# Patient Record
Sex: FEMALE | Race: WHITE
[De-identification: names, ages, dates, MRNs, and addresses within clinical notes are randomized per-mention and may not be internally consistent; named-entity substitution may affect disease eponyms.]

---

## 2017-09-24 ENCOUNTER — HOSPITAL ENCOUNTER (INPATIENT)
Dept: HOSPITAL 10 - MS2 | Age: 77
LOS: 2 days | Discharge: HOME | DRG: 314 | End: 2017-09-26
Attending: INTERNAL MEDICINE | Admitting: INTERNAL MEDICINE
Payer: COMMERCIAL

## 2017-09-24 VITALS — SYSTOLIC BLOOD PRESSURE: 178 MMHG | RESPIRATION RATE: 18 BRPM | HEART RATE: 69 BPM | DIASTOLIC BLOOD PRESSURE: 78 MMHG

## 2017-09-24 VITALS — HEIGHT: 59 IN | WEIGHT: 134.04 LBS | BODY MASS INDEX: 27.02 KG/M2

## 2017-09-24 VITALS — HEART RATE: 70 BPM | SYSTOLIC BLOOD PRESSURE: 133 MMHG | DIASTOLIC BLOOD PRESSURE: 63 MMHG

## 2017-09-24 DIAGNOSIS — I12.0: ICD-10-CM

## 2017-09-24 DIAGNOSIS — N18.6: ICD-10-CM

## 2017-09-24 DIAGNOSIS — Z99.2: ICD-10-CM

## 2017-09-24 DIAGNOSIS — E11.9: ICD-10-CM

## 2017-09-24 DIAGNOSIS — I48.91: ICD-10-CM

## 2017-09-24 DIAGNOSIS — Z79.02: ICD-10-CM

## 2017-09-24 DIAGNOSIS — T82.868A: Primary | ICD-10-CM

## 2017-09-24 PROCEDURE — 90935 HEMODIALYSIS ONE EVALUATION: CPT

## 2017-09-24 PROCEDURE — 85610 PROTHROMBIN TIME: CPT

## 2017-09-24 PROCEDURE — 85730 THROMBOPLASTIN TIME PARTIAL: CPT

## 2017-09-24 PROCEDURE — 85025 COMPLETE CBC W/AUTO DIFF WBC: CPT

## 2017-09-24 PROCEDURE — 71020: CPT

## 2017-09-24 PROCEDURE — 93931 UPPER EXTREMITY STUDY: CPT

## 2017-09-24 PROCEDURE — 83036 HEMOGLOBIN GLYCOSYLATED A1C: CPT

## 2017-09-24 PROCEDURE — 87081 CULTURE SCREEN ONLY: CPT

## 2017-09-24 PROCEDURE — 80053 COMPREHEN METABOLIC PANEL: CPT

## 2017-09-24 PROCEDURE — 82962 GLUCOSE BLOOD TEST: CPT

## 2017-09-24 PROCEDURE — 93005 ELECTROCARDIOGRAM TRACING: CPT

## 2017-09-24 PROCEDURE — 83735 ASSAY OF MAGNESIUM: CPT

## 2017-09-24 PROCEDURE — 93970 EXTREMITY STUDY: CPT

## 2017-09-24 PROCEDURE — 84100 ASSAY OF PHOSPHORUS: CPT

## 2017-09-24 RX ADMIN — HYDRALAZINE HYDROCHLORIDE PRN MG: 20 INJECTION INTRAMUSCULAR; INTRAVENOUS at 23:00

## 2017-09-25 VITALS — DIASTOLIC BLOOD PRESSURE: 80 MMHG | SYSTOLIC BLOOD PRESSURE: 152 MMHG | HEART RATE: 70 BPM

## 2017-09-25 VITALS — DIASTOLIC BLOOD PRESSURE: 88 MMHG | SYSTOLIC BLOOD PRESSURE: 186 MMHG | RESPIRATION RATE: 20 BRPM

## 2017-09-25 VITALS — SYSTOLIC BLOOD PRESSURE: 144 MMHG | DIASTOLIC BLOOD PRESSURE: 79 MMHG | HEART RATE: 67 BPM

## 2017-09-25 VITALS — RESPIRATION RATE: 20 BRPM | SYSTOLIC BLOOD PRESSURE: 112 MMHG | DIASTOLIC BLOOD PRESSURE: 54 MMHG

## 2017-09-25 VITALS — DIASTOLIC BLOOD PRESSURE: 79 MMHG | HEART RATE: 69 BPM | SYSTOLIC BLOOD PRESSURE: 148 MMHG

## 2017-09-25 VITALS — SYSTOLIC BLOOD PRESSURE: 165 MMHG | RESPIRATION RATE: 16 BRPM | DIASTOLIC BLOOD PRESSURE: 74 MMHG

## 2017-09-25 VITALS — HEART RATE: 67 BPM | SYSTOLIC BLOOD PRESSURE: 165 MMHG | DIASTOLIC BLOOD PRESSURE: 81 MMHG

## 2017-09-25 VITALS — DIASTOLIC BLOOD PRESSURE: 73 MMHG | SYSTOLIC BLOOD PRESSURE: 165 MMHG | HEART RATE: 74 BPM

## 2017-09-25 VITALS — HEART RATE: 67 BPM | DIASTOLIC BLOOD PRESSURE: 83 MMHG | SYSTOLIC BLOOD PRESSURE: 156 MMHG

## 2017-09-25 VITALS — SYSTOLIC BLOOD PRESSURE: 159 MMHG | DIASTOLIC BLOOD PRESSURE: 78 MMHG | HEART RATE: 70 BPM

## 2017-09-25 VITALS — RESPIRATION RATE: 16 BRPM | SYSTOLIC BLOOD PRESSURE: 161 MMHG | DIASTOLIC BLOOD PRESSURE: 73 MMHG

## 2017-09-25 VITALS — DIASTOLIC BLOOD PRESSURE: 73 MMHG | HEART RATE: 68 BPM | SYSTOLIC BLOOD PRESSURE: 148 MMHG

## 2017-09-25 VITALS — SYSTOLIC BLOOD PRESSURE: 166 MMHG | DIASTOLIC BLOOD PRESSURE: 81 MMHG | HEART RATE: 70 BPM

## 2017-09-25 LAB
ALBUMIN SERPL-MCNC: 3.6 G/DL (ref 3.3–4.9)
ALBUMIN/GLOB SERPL: 1.12 {RATIO}
ALP SERPL-CCNC: 90 IU/L (ref 42–121)
ALT SERPL-CCNC: 33 IU/L (ref 13–69)
ANION GAP SERPL CALC-SCNC: 16 MMOL/L (ref 8–16)
APTT BLD: 32 SEC (ref 25–35)
AST SERPL-CCNC: 14 IU/L (ref 15–46)
BASOPHILS # BLD AUTO: 0 10^3/UL (ref 0–0.1)
BASOPHILS NFR BLD: 0.5 % (ref 0–2)
BILIRUB DIRECT SERPL-MCNC: 0 MG/DL (ref 0–0.2)
BILIRUB SERPL-MCNC: 0 MG/DL (ref 0.2–1.3)
BUN SERPL-MCNC: 61 MG/DL (ref 7–20)
CALCIUM SERPL-MCNC: 9.1 MG/DL (ref 8.4–10.2)
CHLORIDE SERPL-SCNC: 102 MMOL/L (ref 97–110)
CO2 SERPL-SCNC: 25 MMOL/L (ref 21–31)
CREAT SERPL-MCNC: 5.67 MG/DL (ref 0.44–1)
EOSINOPHIL # BLD: 0.4 10^3/UL (ref 0–0.5)
EOSINOPHIL NFR BLD: 5.5 % (ref 0–7)
ERYTHROCYTE [DISTWIDTH] IN BLOOD BY AUTOMATED COUNT: 14.3 % (ref 11.5–14.5)
GLOBULIN SER-MCNC: 3.2 G/DL (ref 1.3–3.2)
GLUCOSE SERPL-MCNC: 172 MG/DL (ref 70–220)
HCT VFR BLD CALC: 28.1 % (ref 37–47)
HGB BLD-MCNC: 8.7 G/DL (ref 12–16)
INR PPP: 1.76
LYMPHOCYTES # BLD AUTO: 2.4 10^3/UL (ref 0.8–2.9)
LYMPHOCYTES NFR BLD AUTO: 32.2 % (ref 15–51)
MAGNESIUM SERPL-MCNC: 2 MG/DL (ref 1.7–2.5)
MCH RBC QN AUTO: 27.8 PG (ref 29–33)
MCHC RBC AUTO-ENTMCNC: 31 G/DL (ref 32–37)
MCV RBC AUTO: 89.8 FL (ref 82–101)
MONOCYTES # BLD: 0.5 10^3/UL (ref 0.3–0.9)
MONOCYTES NFR BLD: 6.3 % (ref 0–11)
NEUTROPHILS # BLD: 4 10^3/UL (ref 1.6–7.5)
NEUTROPHILS NFR BLD AUTO: 55 % (ref 39–77)
NRBC # BLD MANUAL: 0 10^3/UL (ref 0–0)
NRBC BLD AUTO-RTO: 0 /100WBC (ref 0–0)
PHOSPHATE SERPL-MCNC: 4 MG/DL (ref 2.5–4.9)
PLATELET # BLD: 252 10^3/UL (ref 140–415)
PMV BLD AUTO: 10.1 FL (ref 7.4–10.4)
POTASSIUM SERPL-SCNC: 4.6 MMOL/L (ref 3.5–5.1)
PROT SERPL-MCNC: 6.8 G/DL (ref 6.1–8.1)
PROTHROMBIN TIME: 20.7 SEC (ref 12.2–14.2)
PT RATIO: 1.6
RBC # BLD AUTO: 3.13 10^6/UL (ref 4.2–5.4)
SODIUM SERPL-SCNC: 138 MMOL/L (ref 135–144)
WBC # BLD AUTO: 7.3 10^3/UL (ref 4.8–10.8)

## 2017-09-25 PROCEDURE — 3E05317 INTRODUCTION OF OTHER THROMBOLYTIC INTO PERIPHERAL ARTERY, PERCUTANEOUS APPROACH: ICD-10-PCS | Performed by: SURGERY

## 2017-09-25 PROCEDURE — 5A1D00Z: ICD-10-PCS

## 2017-09-25 RX ADMIN — NIFEDIPINE SCH MG: 90 TABLET, FILM COATED, EXTENDED RELEASE ORAL at 08:35

## 2017-09-25 RX ADMIN — ISOSORBIDE MONONITRATE SCH MG: 30 TABLET, EXTENDED RELEASE ORAL at 08:34

## 2017-09-25 RX ADMIN — FUROSEMIDE SCH MG: 40 TABLET ORAL at 08:35

## 2017-09-25 RX ADMIN — NIFEDIPINE SCH MG: 90 TABLET, FILM COATED, EXTENDED RELEASE ORAL at 21:34

## 2017-09-25 RX ADMIN — LOSARTAN POTASSIUM SCH MG: 50 TABLET, FILM COATED ORAL at 08:34

## 2017-09-25 RX ADMIN — HYDRALAZINE HYDROCHLORIDE PRN MG: 20 INJECTION INTRAMUSCULAR; INTRAVENOUS at 20:45

## 2017-09-25 RX ADMIN — PAROXETINE SCH MG: 10 TABLET, FILM COATED ORAL at 08:35

## 2017-09-25 NOTE — CONS
Date/Time of Note


Date/Time of Note


DATE: 9/25/17 


TIME: 14:54





Assessment/Plan


Assessment/Plan


Additional Assessment/Plan


1. Malfunctioning Permcath HD catheter, missed HD 


2.ESRD on HD on MWF schedule


3. Hypertension 


4. Hyperlipidemia


5. Diabetes melitus


6. atrial fibrillation on coumadin 





Plan :


tPA in HD catheter, we will have HD Nurse to check on it


plan for HD today after tPA use


Dr. santana has been consulted to change permacath due to her recurrent Catheter 

problems


will continue HD on Monday, wednesday and Friday





Consultation Date/Type/Reason


Admit Date/Time


Sep 24, 2017 at 21:40


Date of Consultation:  Sep 24, 2017


Type of Consultation:  NEPHROLOGY


Reason for Consultation


ESRD on HD with malfunctioning  HD catheter


Referring Provider:  FELIZ TUTTLE of Present Illness


76-year-old female being transferred from [facility] who 

presented because her permacatheter for dialysis was not functioning properly 

despite her nephrologist using TPA.  She was sent to the hospital to have 

permacatheter placement and possible mapping for AV fistula formation.  She 

does not know the name of her nephrologist.  She has not been to this hospital 

before.  She was transferred to [facility] as her insurance was 

capitated here.  She has a Monday Wednesday Friday schedule. Renal has been 

consulted for her HD need and for her Clotted Dialysis catheter. tPA was placed 

in ED.. At the time of evaluation she is chest pain free, no SOB.


Constitutional:  no complaints


Eyes:  no complaints


ENT:  no complaints


Respiratory:  cough, pain, pleuritic pain, shortness of breath


Cardiovascular:  no complaints


Gastrointestinal:  no complaints


Genitourinary:  no complaints


Musculoskeletal:  no complaints


Skin:  no complaints


Neurologic:  no complaints


Endocrine:  no complaints


Lymphatic:  no complaints


Psychological:  no complaints


Immunologic:  no complaints





Past Medical History


Medical History:  diabetes, high cholesterol, hypertension, other (ESRD on HD, 

Atrial fibrillation )





Past Surgical History


Past Surgical Hx:  other (Permacath placement, AVF surgery )





Family History


Significant Family History:  no pertinent family hx





Social History


Alcohol Use:  none


Smoking Status:  Never smoker


Drug Use:  none





Exam/Review of Systems


Vital Signs


Vitals





 Vital Signs








  Date Time  Temp Pulse Resp B/P Pulse Ox O2 Delivery O2 Flow Rate FiO2


 


9/25/17 07:26 98.2 69 16 165/74 95   


 


9/24/17 22:00      Room Air  











Exam


Constitutional:  alert


Psych:  no complaints


Head:  normocephalic


Eyes:  nl conjunctiva


ENMT:  nl external ears & nose


Neck:  non-tender, supple


Respiratory:  clear to auscultation, diminished breath sounds, normal air 

movement


Cardiovascular:  irregular rhythm, nl pulses


Gastrointestinal:  soft


Musculoskeletal:  nl extremities to inspection, nl gait and stance


Neurological:  CNS II-XII intact, nl mental status, nl speech, nl strength


Skin:  nl turgor, rash or lesions





Results


Result Diagram:  


9/25/17 0528                                                                   

             9/25/17 0518





Results 24 hrs





Laboratory Tests








Test


  9/25/17


00:36 9/25/17


05:18 9/25/17


05:28 9/25/17


07:52


 


Bedside Glucose 168     152  


 


Prothrombin Time  20.7  H  


 


Prothrombin Time Ratio  1.6    


 


INR International Normalized


Ratio 


  1.76  


  


  


 


 


Activated Partial


Thromboplast Time 


  32.0  


  


  


 


 


Sodium Level  138    


 


Potassium Level  4.6    


 


Chloride Level  102    


 


Carbon Dioxide Level  25    


 


Anion Gap  16    


 


Blood Urea Nitrogen  61  H  


 


Creatinine  5.67  H  


 


Glucose Level  172    


 


Hemoglobin A1c  7.3  H  


 


Calcium Level  9.1    


 


Phosphorus Level  4.0    


 


Magnesium Level  2.0    


 


Total Bilirubin  0.0  L  


 


Direct Bilirubin  0.00    


 


Indirect Bilirubin  0.0    


 


Aspartate Amino Transf


(AST/SGOT) 


  14  L


  


  


 


 


Alanine Aminotransferase


(ALT/SGPT) 


  33  


  


  


 


 


Alkaline Phosphatase  90    


 


Total Protein  6.8    


 


Albumin  3.6    


 


Globulin  3.20    


 


Albumin/Globulin Ratio  1.12    


 


White Blood Count   7.3   


 


Red Blood Count   3.13  L 


 


Hemoglobin   8.7  L 


 


Hematocrit   28.1  L 


 


Mean Corpuscular Volume   89.8   


 


Mean Corpuscular Hemoglobin   27.8  L 


 


Mean Corpuscular Hemoglobin


Concent 


  


  31.0  L


  


 


 


Red Cell Distribution Width   14.3   


 


Platelet Count   252   


 


Mean Platelet Volume   10.1   


 


Neutrophils %   55.0   


 


Lymphocytes %   32.2   


 


Monocytes %   6.3   


 


Eosinophils %   5.5   


 


Basophils %   0.5   


 


Nucleated Red Blood Cells %   0.0   


 


Neutrophils #   4.0   


 


Lymphocytes #   2.4   


 


Monocytes #   0.5   


 


Eosinophils #   0.4   


 


Basophils #   0.0   


 


Nucleated Red Blood Cells #   0.0   














Test


  9/25/17


12:01 


  


  


 


 


Bedside Glucose 144     











Medications


Medications





 Current Medications


Hydralazine HCl (Apresoline) 10 mg Q4H  PRN IV SBP ABOVE 160;  Start 9/24/17 at 

22:30


Hydralazine HCl (Apresoline) 20 mg Q4H  PRN IV SBP ABOVE 180 Last administered 

on 9/24/17at 23:00; Admin Dose 20 MG;  Start 9/24/17 at 22:30


Ondansetron HCl (Zofran Inj) 4 mg Q6H  PRN IV NAUSEA AND/OR VOMITING;  Start 9/ 25/17 at 01:00


Acetaminophen (Tylenol Tab) 650 mg Q6H  PRN PO PAIN LEVEL 1-3 OR FEVER;  Start 9 /25/17 at 01:00


Morphine Sulfate (morphine) 2 mg Q4H  PRN IV SEVERE PAIN LEVEL 7-10;  Start 9/25 /17 at 01:00


Docusate Sodium (Colace) 100 mg Q12H  PRN PO CONSTIPATION;  Start 9/25/17 at 01:

00


Bisacodyl (Dulcolax) 5 mg DAILY  PRN PO CONSTIPATION;  Start 9/25/17 at 01:00


Atorvastatin Calcium (Lipitor) 80 mg QHS PO ;  Start 9/25/17 at 21:00


Carvedilol (Coreg) 6.25 mg BID PO  Last administered on 9/25/17at 08:35; Admin 

Dose 6.25 MG;  Start 9/25/17 at 09:00


Furosemide (Lasix) 40 mg DAILY PO  Last administered on 9/25/17at 08:35; Admin 

Dose 40 MG;  Start 9/25/17 at 09:00


Isosorbide Mononitrate (Imdur) 30 mg DAILY PO  Last administered on 9/25/17at 08

:34; Admin Dose 30 MG;  Start 9/25/17 at 09:00


Losartan Potassium (Cozaar) 100 mg DAILY PO  Last administered on 9/25/17at 08:

34; Admin Dose 100 MG;  Start 9/25/17 at 09:00


Nifedipine (Procardia Xl) 90 mg BID PO  Last administered on 9/25/17at 08:35; 

Admin Dose 90 MG;  Start 9/25/17 at 09:00


Paroxetine HCl (Paxil) 10 mg DAILY PO  Last administered on 9/25/17at 08:35; 

Admin Dose 10 MG;  Start 9/25/17 at 09:00


Diagnostic Test (Pha) (Accu-Chek) 1 ea 02 XX ;  Start 9/26/17 at 02:00


Diagnostic Test (Pha) (Accu-Chek) 1 ea 02 XX ;  Start 9/26/17 at 02:00


Miscellaneous Information 1 ea NOTE XX ;  Start 9/25/17 at 05:30


Glucose (Glutose) 15 gm Q15M  PRN PO DECREASED GLUCOSE;  Start 9/25/17 at 05:30


Glucose (Glutose) 22.5 gm Q15M  PRN PO DECREASED GLUCOSE;  Start 9/25/17 at 05:

30


Dextrose (D50w Syringe) 25 ml Q15M  PRN IV DECREASED GLUCOSE;  Start 9/25/17 at 

05:30


Dextrose (D50w Syringe) 50 ml Q15M  PRN IV DECREASED GLUCOSE;  Start 9/25/17 at 

05:30


Glucagon (Glucagen) 1 mg Q15M  PRN IM DECREASED GLUCOSE;  Start 9/25/17 at 05:30


Glucose (Glutose) 15 gm Q15M  PRN BUCCAL DECREASED GLUCOSE;  Start 9/25/17 at 05

:30


Famotidine (Pepcid) 20 mg DAILY PO ;  Start 9/26/17 at 09:00











RUBY ZUNIGA MD Sep 25, 2017 14:55

## 2017-09-25 NOTE — RADRPT
PROCEDURE:   Chest 2 views. 

 

CLINICAL INDICATION:   Abnormal breath sounds, preop. 

 

TECHNIQUE:   PA and lateral views of the chest were obtained. 

 

COMPARISON:   None. 

 

FINDINGS:

The heart size is within normal limits. Calcified atherosclerosis is noted in the aorta. Right-sided
 dialysis catheter has its tip in the expected location of the mid superior vena cava. No consolidat
ions are identified.  No pneumothorax is seen. Elevated right hemidiaphragm is noted.  Osseous struc
tures are intact. 

 

IMPRESSION:

Calcified atherosclerosis in the aorta. 

 

Right-sided dialysis catheter with its tip in the expected location of the mid superior vena cava.

 

Elevated right hemidiaphragm.

 

Clear lungs.

 

 

RPTAT: AA

_____________________________________________ 

.Baltazar Jones MD, MD           Date    Time 

Electronically viewed and signed by .Baltazar Jones MD, MD on 09/25/2017 09:45 

 

D:  09/25/2017 09:45  T:  09/25/2017 09:45

.P/

## 2017-09-25 NOTE — HP
Date/Time of Note


Date/Time of Note


DATE: 9/25/17 


TIME: 05:56





Assessment/Plan


VTE Prophylaxis


VTE Prophylaxis Intervention:  SCD's





Lines/Catheters


IV Catheter Type (from [facility]):  Saline Lock





Assessment/Plan


Chief Complaint/Hosp Course


This is a 76 year female being admitted to the [facility] for:





#1 malfunctioning dialysis catheter: Apparently as per the patient's 

nephrologist her permacath was not working despite the use of TPA.  She was 

sent in for evaluation for dialysis access.  At the current time we will 

consult nephrology and vascular surgery for dialysis access.  Consider new 

permacath placement and possibility for venous mapping for eventual AV fistula.

  Will obtain a chest x-ray and EKG in the a.m. for preop purposes.





#2  End-stage renal disease: Recent laboratories show sodium of 133 and and a 

BUN of 55 and a creatinine of 4.9.  Would not have any previous labs to compare 

this to.  At the current time patient does state that she is nonoliguric.  Will 

consult nephrology.  Continue patient's home medications.





#3 atrial fibrillation: We will obtain a 12-lead EKG.  Will check PT/INR daily.

  She will be due for Coumadin on the afternoon of 9/25/17 however we will 

confirm with vascular surgery and nephrology regarding possible surgical 

intervention and the timeframe needed for this medication to be held.





#4 hypertension: Continue patient's home medications monitor blood pressure





#5 diabetes mellitus: We will check hemoglobin A1c, insulin sliding scale.





#6 DVT GI prophylaxis: SCDs, acid blocker.  Patient has currently on Coumadin 

as well and will confirm whether she needs her dose on 9/25 once we confirm 

with vascular and nephrology regarding dialysis catheter placement.





Further treatment strategy will be implemented as per the clinical course


Problems:  





HPI/ROS


Admit Date/Time


Admit Date/Time


Sep 24, 2017 at 21:40





Hx of Present Illness


Chief complaint: Malfunctioning dialysis permacatheter





This is a 76-year-old female being transferred from [facility] 

who presented because her permacatheter for dialysis was not functioning 

properly despite her nephrologist using TPA.  She was sent to the hospital to 

have permacatheter placement and possible mapping for AV fistula formation.  

She does not know the name of her nephrologist.  She has not been to this 

hospital before.  She was transferred to [facility] as her insurance 

was capitated here.  She has a Monday Wednesday Friday schedule.  Patient is 

unable to provide me with the location of the dialysis center that she goes to.

  She is compliant with her Coumadin.





Allergies: NKDA





Medications: See MAR





ROS


Const: Negative for fever, chills, weight gain or weight loss, fatigue, or 

diaphoresis


Eyes : No pain discharge or redness or change in visual acuity


ENT: No pain, sore throat, congestion, congestion,  dysphagia or discharge


Respiratory: No shortness of breath, cough, sputum, wheezing, or pleuritic pain


Cardiovascular: No chest pain, palpitation, PND, or edema


GI : no change in appetite, abdominal pain, nausea, vomiting, diarrhea, 

constipation, or change in the color his stool 


Genitourinary: No dysuria, hematuria, flank pain ,  discharge or CVA tenderness


Musculoskeletal: No joint pain, back pain, neck pain, restricted range of 

motion in neck or joints


Skin: No rash, bruising or hives 


Neuro: No headache, dizziness, syncope, seizure, focal weakness


Endocrine: No polyuria, polydipsia, temperature intolerance


Psych: No hallucination, depression, anxiety or suicidal ideation





PMH/Family/Social


Past Medical History


Atrial fibrillation on Coumadin, hypertension, diabetes mellitus, end-stage 

renal disease





Past Surgical History


Left AV fistula surgery however it is no longer patent,  Hysterectomy, 

permacath on the right chest wall malfunctioning now





Family History


Significant Family History:  no pertinent family hx





Social History


Alcohol Use:  none


Smoking Status:  Never smoker


Drug Use:  none





Exam/Review of Systems


Vital Signs


Vitals





 Vital Signs








  Date Time  Temp Pulse Resp B/P Pulse Ox O2 Delivery O2 Flow Rate FiO2


 


9/25/17 02:04 98.4 71 20 112/54 93   


 


9/24/17 22:00      Room Air  











Exam


Exam





General: Patient is a well-developed female lying in bed in no acute distress


HEENT: Atraumatic, normocephalic. The pupils are equal, round and reactive. 

Extraocular motor are intact


Neck: Supple with full range of motion. No rigidity or meningismus


Chest: Right chest permacatheter placement


Lungs: Clear to auscultation bilaterally no crackles rales or wheezing


Heart: Normal S1-S2, Regular rhythm and rate. No murmur, S3, or S4


Abdomen: Soft , nontender,  nondistended , bowel sounds are present. No 

guarding no rebound tenderness , No masses or organomegaly. No costovertebral 

temporal angle mass


Extremities: Normal to inspection, no edema no cyanosis, left upper extremity 

nonfunctioning AV fistula


Neurologic: Normal mental status, speech normal, cranial nerves II through XII 

are intact, motor and sensory are intact, no focal weakness


Additional Comments


Pertinent laboratory findings from transferring facility:





Hemoglobin 9.6 hematocrit 20.4 white blood cell count 7.4 platelets 228


BMP: Sodium 133 potassium 4.5 chloride 97 bicarb 23 BUN 53 creatinine 4.9 GFR 8


[place] 3.9 magnesium 2.2


Please see transfer documentation in the chart for complete details.





Labs


Result Diagram:  


9/25/17 0528








Medications


Medications





 Current Medications


Hydralazine HCl (Apresoline) 10 mg Q4H  PRN IV SBP ABOVE 160;  Start 9/24/17 at 

22:30


Hydralazine HCl (Apresoline) 20 mg Q4H  PRN IV SBP ABOVE 180 Last administered 

on 9/24/17at 23:00; Admin Dose 20 MG;  Start 9/24/17 at 22:30


Ondansetron HCl (Zofran Inj) 4 mg Q6H  PRN IV NAUSEA AND/OR VOMITING;  Start 9/ 25/17 at 01:00


Acetaminophen (Tylenol Tab) 650 mg Q6H  PRN PO PAIN LEVEL 1-3 OR FEVER;  Start 9 /25/17 at 01:00


Morphine Sulfate (morphine) 2 mg Q4H  PRN IV SEVERE PAIN LEVEL 7-10;  Start 9/25 /17 at 01:00


Docusate Sodium (Colace) 100 mg Q12H  PRN PO CONSTIPATION;  Start 9/25/17 at 01:

00


Bisacodyl (Dulcolax) 5 mg DAILY  PRN PO CONSTIPATION;  Start 9/25/17 at 01:00


Famotidine (Pepcid) 20 mg Q12 PO ;  Start 9/25/17 at 09:00


Atorvastatin Calcium (Lipitor) 80 mg QHS PO ;  Start 9/25/17 at 21:00


Carvedilol (Coreg) 6.25 mg BID PO ;  Start 9/25/17 at 09:00


Furosemide (Lasix) 40 mg DAILY PO ;  Start 9/25/17 at 09:00


Isosorbide Mononitrate (Imdur) 30 mg DAILY PO ;  Start 9/25/17 at 09:00


Losartan Potassium (Cozaar) 100 mg DAILY PO ;  Start 9/25/17 at 09:00


Nifedipine (Procardia Xl) 90 mg BID PO ;  Start 9/25/17 at 09:00


Paroxetine HCl (Paxil) 10 mg DAILY PO ;  Start 9/25/17 at 09:00


Diagnostic Test (Pha) (Accu-Chek) 1 ea 02 XX ;  Start 9/26/17 at 02:00


Diagnostic Test (Pha) (Accu-Chek) 1 ea 02 XX ;  Start 9/26/17 at 02:00


Miscellaneous Information 1 ea NOTE XX ;  Start 9/25/17 at 05:30


Glucose (Glutose) 15 gm Q15M  PRN PO DECREASED GLUCOSE;  Start 9/25/17 at 05:30


Glucose (Glutose) 22.5 gm Q15M  PRN PO DECREASED GLUCOSE;  Start 9/25/17 at 05:

30


Dextrose (D50w Syringe) 25 ml Q15M  PRN IV DECREASED GLUCOSE;  Start 9/25/17 at 

05:30


Dextrose (D50w Syringe) 50 ml Q15M  PRN IV DECREASED GLUCOSE;  Start 9/25/17 at 

05:30


Glucagon (Glucagen) 1 mg Q15M  PRN IM DECREASED GLUCOSE;  Start 9/25/17 at 05:30


Glucose (Glutose) 15 gm Q15M  PRN BUCCAL DECREASED GLUCOSE;  Start 9/25/17 at 05

:30











FELIZ TUTTLE Sep 25, 2017 06:07

## 2017-09-26 VITALS — DIASTOLIC BLOOD PRESSURE: 62 MMHG | RESPIRATION RATE: 16 BRPM | SYSTOLIC BLOOD PRESSURE: 146 MMHG

## 2017-09-26 VITALS — RESPIRATION RATE: 18 BRPM | SYSTOLIC BLOOD PRESSURE: 134 MMHG | DIASTOLIC BLOOD PRESSURE: 63 MMHG

## 2017-09-26 VITALS — RESPIRATION RATE: 16 BRPM | DIASTOLIC BLOOD PRESSURE: 59 MMHG | SYSTOLIC BLOOD PRESSURE: 126 MMHG

## 2017-09-26 LAB
ALBUMIN SERPL-MCNC: 3.8 G/DL (ref 3.3–4.9)
ALBUMIN/GLOB SERPL: 1.26 {RATIO}
ALP SERPL-CCNC: 85 IU/L (ref 42–121)
ALT SERPL-CCNC: 32 IU/L (ref 13–69)
ANION GAP SERPL CALC-SCNC: 15 MMOL/L (ref 8–16)
APTT BLD: 30.3 SEC (ref 25–35)
AST SERPL-CCNC: 18 IU/L (ref 15–46)
BASOPHILS # BLD AUTO: 0 10^3/UL (ref 0–0.1)
BASOPHILS NFR BLD: 0.6 % (ref 0–2)
BILIRUB DIRECT SERPL-MCNC: 0 MG/DL (ref 0–0.2)
BILIRUB SERPL-MCNC: 0 MG/DL (ref 0.2–1.3)
BUN SERPL-MCNC: 48 MG/DL (ref 7–20)
CALCIUM SERPL-MCNC: 8.6 MG/DL (ref 8.4–10.2)
CHLORIDE SERPL-SCNC: 103 MMOL/L (ref 97–110)
CO2 SERPL-SCNC: 27 MMOL/L (ref 21–31)
CREAT SERPL-MCNC: 4.62 MG/DL (ref 0.44–1)
EOSINOPHIL # BLD: 0.3 10^3/UL (ref 0–0.5)
EOSINOPHIL NFR BLD: 4.5 % (ref 0–7)
ERYTHROCYTE [DISTWIDTH] IN BLOOD BY AUTOMATED COUNT: 14.1 % (ref 11.5–14.5)
GLOBULIN SER-MCNC: 3 G/DL (ref 1.3–3.2)
GLUCOSE SERPL-MCNC: 157 MG/DL (ref 70–220)
HCT VFR BLD CALC: 29.1 % (ref 37–47)
HGB BLD-MCNC: 9.2 G/DL (ref 12–16)
INR PPP: 1.59
LYMPHOCYTES # BLD AUTO: 2.1 10^3/UL (ref 0.8–2.9)
LYMPHOCYTES NFR BLD AUTO: 29.9 % (ref 15–51)
MAGNESIUM SERPL-MCNC: 1.9 MG/DL (ref 1.7–2.5)
MCH RBC QN AUTO: 28.2 PG (ref 29–33)
MCHC RBC AUTO-ENTMCNC: 31.6 G/DL (ref 32–37)
MCV RBC AUTO: 89.3 FL (ref 82–101)
MONOCYTES # BLD: 0.6 10^3/UL (ref 0.3–0.9)
MONOCYTES NFR BLD: 8.9 % (ref 0–11)
NEUTROPHILS # BLD: 3.8 10^3/UL (ref 1.6–7.5)
NEUTROPHILS NFR BLD AUTO: 55.5 % (ref 39–77)
NRBC # BLD MANUAL: 0 10^3/UL (ref 0–0)
NRBC BLD AUTO-RTO: 0 /100WBC (ref 0–0)
PHOSPHATE SERPL-MCNC: 4.7 MG/DL (ref 2.5–4.9)
PLATELET # BLD: 253 10^3/UL (ref 140–415)
PMV BLD AUTO: 9.8 FL (ref 7.4–10.4)
POTASSIUM SERPL-SCNC: 4.7 MMOL/L (ref 3.5–5.1)
PROT SERPL-MCNC: 6.8 G/DL (ref 6.1–8.1)
PROTHROMBIN TIME: 19.1 SEC (ref 12.2–14.2)
PT RATIO: 1.5
RBC # BLD AUTO: 3.26 10^6/UL (ref 4.2–5.4)
SODIUM SERPL-SCNC: 140 MMOL/L (ref 135–144)
WBC # BLD AUTO: 6.9 10^3/UL (ref 4.8–10.8)

## 2017-09-26 RX ADMIN — NIFEDIPINE SCH MG: 90 TABLET, FILM COATED, EXTENDED RELEASE ORAL at 08:20

## 2017-09-26 RX ADMIN — LOSARTAN POTASSIUM SCH MG: 50 TABLET, FILM COATED ORAL at 08:21

## 2017-09-26 RX ADMIN — ISOSORBIDE MONONITRATE SCH MG: 30 TABLET, EXTENDED RELEASE ORAL at 08:21

## 2017-09-26 RX ADMIN — FUROSEMIDE SCH MG: 40 TABLET ORAL at 08:20

## 2017-09-26 RX ADMIN — PAROXETINE SCH MG: 10 TABLET, FILM COATED ORAL at 08:20

## 2017-09-26 NOTE — RADRPT
PROCEDURE:   US Bilateral Upper Extremity Veins. 

 

CLINICAL INDICATION:   Bilateral upper extremity swelling. Venous diameter for dialysis fistula plan
mahogany. End-stage renal disease.

 

TECHNIQUE:   Multiple longitudinal and transverse images of the bilateral upper extremity venous loulou
e was obtained with gray scale and color Doppler imaging. 

 

COMPARISON:   None available 

 

FINDINGS:

The subclavian veins, axillary, brachial, basilic, and cephalic veins are patent bilaterally. There 
is normal flow with augmentation and compressibility throughout.  There is no thrombus or occlusion.
 There is a left radial artery to cephalic vein dialysis fistula. Therefore, the left cephalic vein 
in the forearm was not evaluated.

Diameters are as follows:

Right arm cephalic upper: 0.26 cm. 

Right arm cephalic mid: 0.12 cm.

Right arm cephalic lower: 0.18 cm.

 

Right forearm cephalic upper: 0.18 cm. 

Right forearm cephalic mid: 0.15 cm.

Right forearm cephalic lower: 0.14 cm.

 

Right arm basilic upper: 0.69 cm. 

Right arm basilic mid: 0.23 cm.

Right arm basilic lower: 0.23 cm.

 

Right forearm basilic upper: 0.11 cm. 

Right forearm basilic mid: 0.13 cm.

Right forearm basilic lower: 0.11 cm.

 

Left arm cephalic upper: 0.23 cm. 

Left arm cephalic mid: 0.21 cm.

Left arm cephalic lower: 0.15 cm.

 

Left forearm cephalic: Not evaluated as indicated above. 

 

Left arm basilic upper: 0.92 cm. 

Left arm basilic mid: 0.18 cm.

Left arm basilic lower: 0.24 cm.

 

Left forearm basilic upper: 0.23 cm. 

Left forearm basilic mid: 0.17 cm.

Left forearm basilic lower:  0.13 cm.

 

IMPRESSION:

1.  Cephalic vein in the outflow vein 48 dialysis fistula.

2.  Otherwise normal venous system of the upper extremities.  No evidence of thrombus or occlusion.

3.  The diameter of the vessels as indicated above.

 

RPTAT: QQ

_____________________________________________ 

.Joe Leija MD, MD           Date    Time 

Electronically viewed and signed by .Joe Leija MD, MD on 09/26/2017 08:48 

 

D:  09/26/2017 08:48  T:  09/26/2017 08:48

.R/

## 2017-09-26 NOTE — CONS
Date/Time of Note


Date/Time of Note


DATE: 9/26/17 


TIME: 12:36





Assessment/Plan


Assessment/Plan


Chief Complaint/Hosp Course


76-year-old female being transferred from [facility] who 

presented because her permacatheter for dialysis was not functioning properly 

despite her nephrologist using TPA.  She was sent to the hospital to have 

permacatheter placement and possible mapping for AV fistula formation.  She 

does not know the name of her nephrologist.  She has not been to this hospital 

before.  She was transferred to [facility] as her insurance was 

capitated here.  She has a Monday Wednesday Friday schedule. Renal has been 

consulted for her HD need and for her Clotted Dialysis catheter. tPA was placed 

in ED.. At the time of evaluation she is chest pain free, no SOB.


Problems:  


Additional Assessment/Plan


1. Malfunctioning Permcath HD catheter, missed HD 


2.ESRD on HD on MWF schedule


3. Hypertension 


4. Hyperlipidemia


5. Diabetes melitus


6. atrial fibrillation on coumadin 





Plan :


tPA in HD catheter, worked good, ok to d/c home today 


will need vascular surgery follow up for AVF evaluatin, vein mapping done in 

hospital 


will continue HD on Monday, wednesday and Friday





Consultation Date/Type/Reason


Admit Date/Time


Sep 24, 2017 at 21:40


Initial Consult Date


9/24/17


Type of Consultation:  NEPHROLOGY


Referring Provider:  FELIZ TUTTLE





Exam/Review of Systems


Vital Signs


Vitals





 Vital Signs








  Date Time  Temp Pulse Resp B/P Pulse Ox O2 Delivery O2 Flow Rate FiO2


 


9/26/17 07:47 98.6 71 16 146/62 97   


 


9/24/17 22:00      Room Air  














 Intake and Output   


 


 9/25/17 9/25/17 9/26/17





 15:00 23:00 07:00


 


Intake Total  1140 ml 300 ml


 


Output Total  1000 ml 


 


Balance  140 ml 300 ml











Results


Result Diagram:  


9/26/17 0503                                                                   

             9/26/17 0502





Results 24 hrs





Laboratory Tests








Test


  9/25/17


17:15 9/25/17


20:32 9/26/17


01:24 9/26/17


05:02


 


Bedside Glucose 158   205   134   


 


Prothrombin Time    19.1  H


 


Prothrombin Time Ratio    1.5  


 


INR International Normalized


Ratio 


  


  


  1.59  


 


 


Activated Partial


Thromboplast Time 


  


  


  30.3  


 


 


Sodium Level    140  


 


Potassium Level    4.7  


 


Chloride Level    103  


 


Carbon Dioxide Level    27  


 


Anion Gap    15  


 


Blood Urea Nitrogen    48  #H


 


Creatinine    4.62  #H


 


Glucose Level    157  


 


Calcium Level    8.6  


 


Phosphorus Level    4.7  


 


Magnesium Level    1.9  


 


Total Bilirubin    0.0  L


 


Direct Bilirubin    0.00  


 


Indirect Bilirubin    0.0  


 


Aspartate Amino Transf


(AST/SGOT) 


  


  


  18  


 


 


Alanine Aminotransferase


(ALT/SGPT) 


  


  


  32  


 


 


Alkaline Phosphatase    85  


 


Total Protein    6.8  


 


Albumin    3.8  


 


Globulin    3.00  


 


Albumin/Globulin Ratio    1.26  














Test


  9/26/17


05:03 9/26/17


07:53 9/26/17


11:59 


 


 


White Blood Count 6.9     


 


Red Blood Count 3.26  L   


 


Hemoglobin 9.2  L   


 


Hematocrit 29.1  L   


 


Mean Corpuscular Volume 89.3     


 


Mean Corpuscular Hemoglobin 28.2  L   


 


Mean Corpuscular Hemoglobin


Concent 31.6  L


  


  


  


 


 


Red Cell Distribution Width 14.1     


 


Platelet Count 253     


 


Mean Platelet Volume 9.8     


 


Neutrophils % 55.5     


 


Lymphocytes % 29.9     


 


Monocytes % 8.9     


 


Eosinophils % 4.5     


 


Basophils % 0.6     


 


Nucleated Red Blood Cells % 0.0     


 


Neutrophils # 3.8     


 


Lymphocytes # 2.1     


 


Monocytes # 0.6     


 


Eosinophils # 0.3     


 


Basophils # 0.0     


 


Nucleated Red Blood Cells # 0.0     


 


Bedside Glucose  153   164   











Medications


Medications





 Current Medications


Hydralazine HCl (Apresoline) 10 mg Q4H  PRN IV SBP ABOVE 160;  Start 9/24/17 at 

22:30


Hydralazine HCl (Apresoline) 20 mg Q4H  PRN IV SBP ABOVE 180 Last administered 

on 9/25/17at 20:45; Admin Dose 20 MG;  Start 9/24/17 at 22:30


Ondansetron HCl (Zofran Inj) 4 mg Q6H  PRN IV NAUSEA AND/OR VOMITING;  Start 9/ 25/17 at 01:00


Acetaminophen (Tylenol Tab) 650 mg Q6H  PRN PO PAIN LEVEL 1-3 OR FEVER;  Start 9 /25/17 at 01:00


Morphine Sulfate (morphine) 2 mg Q4H  PRN IV SEVERE PAIN LEVEL 7-10;  Start 9/25 /17 at 01:00


Docusate Sodium (Colace) 100 mg Q12H  PRN PO CONSTIPATION;  Start 9/25/17 at 01:

00


Bisacodyl (Dulcolax) 5 mg DAILY  PRN PO CONSTIPATION;  Start 9/25/17 at 01:00


Atorvastatin Calcium (Lipitor) 80 mg QHS PO  Last administered on 9/25/17at 20:

38; Admin Dose 80 MG;  Start 9/25/17 at 21:00


Carvedilol (Coreg) 6.25 mg BID PO  Last administered on 9/26/17at 08:22; Admin 

Dose 6.25 MG;  Start 9/25/17 at 09:00


Furosemide (Lasix) 40 mg DAILY PO  Last administered on 9/26/17at 08:20; Admin 

Dose 40 MG;  Start 9/25/17 at 09:00


Isosorbide Mononitrate (Imdur) 30 mg DAILY PO  Last administered on 9/26/17at 08

:21; Admin Dose 30 MG;  Start 9/25/17 at 09:00


Losartan Potassium (Cozaar) 100 mg DAILY PO  Last administered on 9/26/17at 08:

21; Admin Dose 100 MG;  Start 9/25/17 at 09:00


Nifedipine (Procardia Xl) 90 mg BID PO  Last administered on 9/26/17at 08:20; 

Admin Dose 90 MG;  Start 9/25/17 at 09:00


Paroxetine HCl (Paxil) 10 mg DAILY PO  Last administered on 9/26/17at 08:20; 

Admin Dose 10 MG;  Start 9/25/17 at 09:00


Diagnostic Test (Pha) (Accu-Chek) 1 ea 02 XX  Last administered on 9/26/17at 01:

25; Admin Dose 1 EA;  Start 9/26/17 at 02:00


Miscellaneous Information 1 ea NOTE XX ;  Start 9/25/17 at 05:30


Glucose (Glutose) 15 gm Q15M  PRN PO DECREASED GLUCOSE;  Start 9/25/17 at 05:30


Glucose (Glutose) 22.5 gm Q15M  PRN PO DECREASED GLUCOSE;  Start 9/25/17 at 05:

30


Dextrose (D50w Syringe) 25 ml Q15M  PRN IV DECREASED GLUCOSE;  Start 9/25/17 at 

05:30


Dextrose (D50w Syringe) 50 ml Q15M  PRN IV DECREASED GLUCOSE;  Start 9/25/17 at 

05:30


Glucagon (Glucagen) 1 mg Q15M  PRN IM DECREASED GLUCOSE;  Start 9/25/17 at 05:30


Glucose (Glutose) 15 gm Q15M  PRN BUCCAL DECREASED GLUCOSE;  Start 9/25/17 at 05

:30


Famotidine (Pepcid) 20 mg DAILY PO  Last administered on 9/26/17at 08:21; Admin 

Dose 20 MG;  Start 9/26/17 at 09:00











RUBY ZUNIGA MD Sep 26, 2017 12:37

## 2017-09-26 NOTE — RADRPT
PROCEDURE:   US left upper extremity arterial system. 

 

CLINICAL INDICATION:   End-stage renal disease. Left upper extremity dialysis fistula malfunction. 

 

TECHNIQUE:   Multiple longitudinal and transverse images of the left upper extremity arterial tree w
as obtained with gray scale pulsed Doppler, and color Doppler imaging. 

 

COMPARISON:   None available 

 

FINDINGS:

The left radial artery demonstrates normal triphasic flow with a peak systolic velocity of 162 cm/se
c. There is a patent dialysis fistula at this site. Velocity and flow within the fistula is as follo
ws:

Proximal:  211 cm/sec. 183 mL per minute.

Mid:  28 cm/sec. 31 ml/minute.

Distal 149 cm/sec. 478 ml/minute.

Upper arm proximal: 173 cm/sec. 164 ml/minute.

Upper arm mid:  174 cm/sec. 87 ml/minute.

Upper arm distal: 70 cm/sec. 249 ml/minute.

 

IMPRESSION:

1.  Patent left radial artery to cephalic vein dialysis fistula.

2.  Velocities and volume of flow as indicated above.

 

RPTAT: QQ

_____________________________________________ 

.Joe Leija MD, MD           Date    Time 

Electronically viewed and signed by .Joe Leija MD, MD on 09/26/2017 08:46 

 

D:  09/26/2017 08:46  T:  09/26/2017 08:46

.R/

## 2017-09-26 NOTE — PDOCDIS
Discharge Instructions


CONDITION


Patient Condition:  Good





HOME CARE INSTRUCTIONS:


Special Diet:  RENAL





ACTIVITY:








Activity Restrictions:   No Restrictions











FOLLOW UP/APPOINTMENTS


Follow-up Plan


F/U WITH YOUR PCP IN 1-2 WEEKS











EDSON NAILS Sep 26, 2017 12:23

## 2017-09-26 NOTE — CONS
DATE OF ADMISSION:  09/24/2017

 

DATE OF CONSULTATION:  09/25/2017

 

REASON FOR CONSULTATION:  Dysfunctional Permcath.

 

HISTORY OF PRESENT ILLNESS:  This is a 76-year-old female, with a

history of end-stage renal disease, currently on dialysis with

Permcath.  Permcath was not functioning.  The patient has been

admitted.  TPA has been placed in the Permcath, which is now

functioning again.

 

PAST MEDICAL HISTORY:  Hypertension, hyperlipidemia, end-stage

renal disease, significant for atrial fibrillation.

 

PAST SURGICAL HISTORY:  Also positive left upper extremity AV

fistula, hysterectomy, PermCath.

 

ALLERGIES:  NONE.

 

SOCIAL HISTORY:  No smoking, drinking or drug use.

 

MEDICATION:  List reviewed.

 

PHYSICAL EXAMINATION:

VITAL SIGNS:  Blood pressure is 136/60, pulse is 80, respirations

18.

HEART:  A regular rate and rhythm.  Normal S1, S2.

LUNGS:  Clear.

ABDOMEN:  Soft.

EXTREMITIES:  Warm.  Permcath is in place.

 

LABORATORY:  Hemoglobin 8.7, white count 7.3.  Potassium 4.5.

 

IMPRESSION:  Dysfunctional left upper extremity arteriovenous

fistula, which is now functioning okay after tPA infusion.

 

RECOMMENDATIONS:  We will continue dialysis.  Patient will be a

candidate to have an AV fistula when she is more stable.

 

 

 

Dictated By:  Murray Ferguson MD

 

/feng/paulette

Job#:  [id number]/Document#:  [id number]

D:  09/25/2017 18:19

T:  09/26/2017 03:36

## 2017-09-26 NOTE — DS
Date/Time of Note


Date/Time of Note


DATE: 9/26/17 


TIME: 15:20





Discharge Summary


Admission/Discharge Info


Admit Date/Time


Sep 24, 2017 at 21:40


Discharge Date/Time


September 26, 2017


Discharge Diagnosis





#1  End-stage renal disease with malfunctioning dialysis catheter-

malfunctioning status post TPA


Patient to have outpatient fistulogram to evaluate if fistula is functioning, 

the meantime patient will receive dialysis via [facility]


Vascular surgery and nephrology consultations appreciated








#2  Diabetes-A1c at 7.3


Continue home meds





#3 atrial fibrillation-stable


Continue home Coreg and Coumadin





#4 hypertension-stable


Continue home meds


Patient Condition:  Good


Hospital Course


Patient is a 76-year-old female being transferred from [facility] who presented because her permacatheter for dialysis was not functioning 

properly despite her nephrologist using TPA.  She was sent to the hospital for 

malfunctioning catheter as well as possible thrombosed fistula.  Patient's 

permanent cath ultimately did function and patient was able to receive 

dialysis.  Patient was seen by nephrology and vascular surgery, patient will 

need a outpatient fistulogram to evaluate whether or not the fistula is 

functional.  Of note fistula was placed 3 months ago.  Patient was clear for DC 

per nephrology on the day of discharge patient's vitals, labs and physical exam 

are stable showed no acute complaints and questions are answered.


Home Meds


Reported Medications


Nifedipine* (Nifedipine ER*) 90 Mg Tablet.sa, 90 MG PO BID, TAB.SA


   9/24/17


Sevelamer Hcl* (Renagel*) 800 Mg Tablet, 800 MG PO WITH MEALS, TAB


   9/24/17


Paroxetine Hcl* (Paroxetine*) 10 Mg Tablet, 10 MG PO DAILY, TAB


   9/24/17


Omeprazole* (Omeprazole*) 20 Mg Capsule.dr, 20 MG PO DAILY, #30 CAP


   9/24/17


Isosorbide Mononitrate* (Isosorbide Mononitrate*) 30 Mg Tab.er.24h, 30 MG PO 

DAILY, TAB


   9/24/17


Multivitamin (MULTI-VITAMIN DAILY) 1 Each Tablet, 1 TAB PO DAILY, TAB


   9/24/17


Omega-3/Dha/Epa/Fish Oil (FISH  MG SOFTGEL) 1 Each Capsule, 1 EACH PO BID

, CAP


   9/24/17


Aspirin* (Aspirin* EC) 81 Mg Tablet.dr, 81 MG PO DAILY, TAB


   9/24/17


Losartan Potassium* (Losartan Potassium*) 100 Mg Tablet, 100 MG PO DAILY, TAB


   9/24/17


Furosemide* (Lasix*) 40 Mg Tablet, 40 MG PO DAILY, TAB


   9/24/17


Carvedilol* (Carvedilol*) 6.25 Mg Tablet, 6.25 MG PO BID, #60 TAB


   9/24/17


Atorvastatin* (Atorvastatin*) 80 Mg Tablet, 80 MG PO QHS, #30 TAB


   9/24/17


Warfarin Sod (Coumadin) 2.5 Mg Tab, 2.5 MG PO, TAB


   SUNDAY, TUESDAY, THURSDAY, SATURDAY 9/24/17


Warfarin Sodium* (Coumadin*) 5 Mg Tablet, 5 MG PO MONWEDFRI, TAB


   9/24/17


Follow-up Plan


F/U WITH YOUR PCP IN 1-2 WEEKS


Primary Care Provider


Not On Staff Doctor


Time spent on discharge:   > 30 minutes











EDSON NAILS Sep 26, 2017 15:46

## 2017-09-26 NOTE — HP
DATE OF ADMISSION:  09/24/2017

 

Dear Doctors:

 

HISTORY OF PRESENT ILLNESS:  Ms. Mason is 76-year-old female,

who was transferred from [facility] secondary to

patient having a malfunctioning right chest wall Perm catheter.

It seems that the patient has been on dialysis for some time now

with history of previous left upper extremity radiocephalic

fistula that has been nonfunctional.  The patient reports that

she had the right chest wall catheter placed about 3 months ago

and it seems that there were some issues with the catheter during

her dialysis sessions.  TPA has been placed in the emergency

department, awaiting further evaluation as she cannot tolerate a

session of dialysis post tPA infusion.

 

At the moment, patient denies shortness of breath, chest pain,

nausea, vomiting, fever, or chills.

 

REVIEW OF SYSTEMS:  Fourteen point review performed, negative

except was mentioned in HPI.

 

PAST MEDICAL HISTORY:  Diabetes, hypertension, hyperlipidemia,

coronary artery disease, anemia of chronic disease, atrial

fibrillation, end-stage renal disease, on hemodialysis.

 

PAST SURGICAL HISTORY:  Left upper extremity AV fistula creation,

Perm catheter placements.

 

FAMILY HISTORY:  Hypertension.

 

SOCIAL HISTORY:  Denies tobacco, alcohol or illicit drug use.

 

PHYSICAL EXAMINATION:

GENERAL:  Alert, oriented x3.  No apparent distress.

HEENT:  Normocephalic, atraumatic.  PERRLA.  EOMI.  Mucosa moist.

NECK:  Supple.  No carotid bruit.  Poor dentition.

HEART:  S1, S2 present.  No murmurs.

ABDOMEN:  Soft, nontender, nondistended.  Bowel sounds positive.

EXTREMITIES:  Lower extremities: Palpable femoral pulse.  Faint

pedal pulse.  Motor and sensory intact.  Capillary refill 3

seconds.  Left upper extremity: Palpable brachial pulse.  Motor

and sensory intact.  Previous surgical scar on the forearm that

is well healed.  There is a fistula that is pulsatile.  Unable to

detect adequate bruit and thrill.

 

Right chest wall catheter seems to be intact and no erythema

identified.

 

IMPRESSION AND PLAN:

1.   End-stage renal disease, and central stenosis: It seems that

  the patient has had a long-standing history of previous chest

  wall catheters, most recent one for about 3 months that has been

  malfunctioning.  We will plan to see if the patient can tolerate

  dialysis posterior tPA infusion in the dialysis catheter.

  Otherwise, we will plan to exchange the catheter.  Further, the

  patient would require upper bilateral upper extremity venograms

  to better delineate her central venous drainage as the patient

  may have some component of central stenosis with history of

  multiple chest wall catheters in the past.

2.   We will also plan to obtain a fistula ultrasound to further

evaluate her left upper extremity fistula that has been

nonfunctional.

3.   Likely the patient will require bilateral upper extremity

vein mapping for creation of a new access.

4.   Optimize vascular status (blood pressure meds, diet,

nutrition, exercise, sugar control, antiplatelets).

 

Discussed findings, plan and management with the patient with a

certified .  She understands.

 

Thank you for allowing us to partake in the care of your patient.

Please call with any questions.

 

 

 

Dictated By:  Eduar Kline MD

 

/feng/paulette

Job#:  [id number]/Document#:  [id number]

D:  09/25/2017 19:57

T:  09/26/2017 02:37

## 2017-09-27 NOTE — RADRPT
Vent Rate: 65 bpm

RR Interval: 0 msec

NJ Interval: 180 msec

QRS Duration: 90 msec

QT Interval: 464 msec

QTC Interval: 482 msec

P-R-T Axis: 53 - -37 - 54 degrees

 

 Normal sinus rhythm

 Left axis deviation

 Septal infarct , age undetermined

 Abnormal ECG

 

Electronically Signed By: Kalyan Hager

[id number]

## 2017-09-28 ENCOUNTER — HOSPITAL ENCOUNTER (INPATIENT)
Dept: HOSPITAL 10 - E/R | Age: 77
LOS: 3 days | Discharge: HOME | DRG: 252 | End: 2017-10-01
Attending: INTERNAL MEDICINE | Admitting: INTERNAL MEDICINE
Payer: COMMERCIAL

## 2017-09-28 VITALS
BODY MASS INDEX: 30.98 KG/M2 | HEIGHT: 63 IN | WEIGHT: 174.83 LBS | WEIGHT: 174.83 LBS | HEIGHT: 63 IN | BODY MASS INDEX: 30.98 KG/M2

## 2017-09-28 VITALS — SYSTOLIC BLOOD PRESSURE: 194 MMHG | HEART RATE: 73 BPM | DIASTOLIC BLOOD PRESSURE: 84 MMHG | RESPIRATION RATE: 19 BRPM

## 2017-09-28 VITALS — TEMPERATURE: 98.3 F

## 2017-09-28 DIAGNOSIS — E11.22: ICD-10-CM

## 2017-09-28 DIAGNOSIS — N18.6: ICD-10-CM

## 2017-09-28 DIAGNOSIS — T82.41XA: ICD-10-CM

## 2017-09-28 DIAGNOSIS — Z79.82: ICD-10-CM

## 2017-09-28 DIAGNOSIS — N30.00: ICD-10-CM

## 2017-09-28 DIAGNOSIS — Z99.2: ICD-10-CM

## 2017-09-28 DIAGNOSIS — I12.0: ICD-10-CM

## 2017-09-28 DIAGNOSIS — D63.1: ICD-10-CM

## 2017-09-28 DIAGNOSIS — T82.868A: Primary | ICD-10-CM

## 2017-09-28 DIAGNOSIS — E78.5: ICD-10-CM

## 2017-09-28 DIAGNOSIS — Z79.01: ICD-10-CM

## 2017-09-28 DIAGNOSIS — I48.0: ICD-10-CM

## 2017-09-28 DIAGNOSIS — K21.9: ICD-10-CM

## 2017-09-28 LAB
ADD UMIC: YES
ANION GAP SERPL CALC-SCNC: 20 MMOL/L (ref 8–16)
BASOPHILS # BLD AUTO: 0.1 10^3/UL (ref 0–0.1)
BASOPHILS NFR BLD: 0.7 % (ref 0–2)
BUN SERPL-MCNC: 53 MG/DL (ref 7–20)
CALCIUM SERPL-MCNC: 9.5 MG/DL (ref 8.4–10.2)
CHLORIDE SERPL-SCNC: 96 MMOL/L (ref 97–110)
CO2 SERPL-SCNC: 28 MMOL/L (ref 21–31)
COLOR UR: YELLOW
CREAT SERPL-MCNC: 5.11 MG/DL (ref 0.44–1)
EOSINOPHIL # BLD: 0.3 10^3/UL (ref 0–0.5)
EOSINOPHIL NFR BLD: 3.4 % (ref 0–7)
ERYTHROCYTE [DISTWIDTH] IN BLOOD BY AUTOMATED COUNT: 14.3 % (ref 11.5–14.5)
GLUCOSE SERPL-MCNC: 152 MG/DL (ref 70–220)
GLUCOSE UR STRIP-MCNC: NEGATIVE MG/DL
HCT VFR BLD CALC: 33.4 % (ref 37–47)
HGB BLD-MCNC: 10.3 G/DL (ref 12–16)
KETONES UR STRIP.AUTO-MCNC: NEGATIVE MG/DL
LYMPHOCYTES # BLD AUTO: 2.5 10^3/UL (ref 0.8–2.9)
LYMPHOCYTES NFR BLD AUTO: 33 % (ref 15–51)
MCH RBC QN AUTO: 27.5 PG (ref 29–33)
MCHC RBC AUTO-ENTMCNC: 30.8 G/DL (ref 32–37)
MCV RBC AUTO: 89.1 FL (ref 82–101)
MONOCYTES # BLD: 0.6 10^3/UL (ref 0.3–0.9)
MONOCYTES NFR BLD: 7.7 % (ref 0–11)
NEUTROPHILS # BLD: 4.1 10^3/UL (ref 1.6–7.5)
NEUTROPHILS NFR BLD AUTO: 54.3 % (ref 39–77)
NITRITE UR QL STRIP.AUTO: NEGATIVE MG/DL
NRBC # BLD MANUAL: 0 10^3/UL (ref 0–0)
NRBC BLD AUTO-RTO: 0 /100WBC (ref 0–0)
PLATELET # BLD: 278 10^3/UL (ref 140–415)
PMV BLD AUTO: 10.1 FL (ref 7.4–10.4)
POTASSIUM SERPL-SCNC: 4.7 MMOL/L (ref 3.5–5.1)
RBC # BLD AUTO: 3.75 10^6/UL (ref 4.2–5.4)
RBC # UR AUTO: NEGATIVE MG/DL
SODIUM SERPL-SCNC: 139 MMOL/L (ref 135–144)
TROPONIN I SERPL-MCNC: 0.01 NG/ML (ref 0–0.12)
UR ASCORBIC ACID: NEGATIVE MG/DL
UR BILIRUBIN (DIP): NEGATIVE MG/DL
UR CLARITY: (no result)
UR PH (DIP): 7 (ref 5–9)
UR RBC: 6 /HPF (ref 0–5)
UR SPECIFIC GRAVITY (DIP): 1.01 (ref 1–1.03)
UR TOTAL PROTEIN (DIP): (no result) MG/DL
UROBILINOGEN UR STRIP-ACNC: NEGATIVE MG/DL
WBC # BLD AUTO: 7.5 10^3/UL (ref 4.8–10.8)
WBC # UR STRIP: (no result) LEU/UL

## 2017-09-28 PROCEDURE — 80053 COMPREHEN METABOLIC PANEL: CPT

## 2017-09-28 PROCEDURE — 93005 ELECTROCARDIOGRAM TRACING: CPT

## 2017-09-28 PROCEDURE — 82550 ASSAY OF CK (CPK): CPT

## 2017-09-28 PROCEDURE — 82553 CREATINE MB FRACTION: CPT

## 2017-09-28 PROCEDURE — 83036 HEMOGLOBIN GLYCOSYLATED A1C: CPT

## 2017-09-28 PROCEDURE — 93306 TTE W/DOPPLER COMPLETE: CPT

## 2017-09-28 PROCEDURE — 90935 HEMODIALYSIS ONE EVALUATION: CPT

## 2017-09-28 PROCEDURE — 90686 IIV4 VACC NO PRSV 0.5 ML IM: CPT

## 2017-09-28 PROCEDURE — 87086 URINE CULTURE/COLONY COUNT: CPT

## 2017-09-28 PROCEDURE — 81001 URINALYSIS AUTO W/SCOPE: CPT

## 2017-09-28 PROCEDURE — 36415 COLL VENOUS BLD VENIPUNCTURE: CPT

## 2017-09-28 PROCEDURE — 80061 LIPID PANEL: CPT

## 2017-09-28 PROCEDURE — 82962 GLUCOSE BLOOD TEST: CPT

## 2017-09-28 PROCEDURE — 84484 ASSAY OF TROPONIN QUANT: CPT

## 2017-09-28 PROCEDURE — 83735 ASSAY OF MAGNESIUM: CPT

## 2017-09-28 PROCEDURE — 85025 COMPLETE CBC W/AUTO DIFF WBC: CPT

## 2017-09-28 PROCEDURE — 84100 ASSAY OF PHOSPHORUS: CPT

## 2017-09-28 PROCEDURE — 80048 BASIC METABOLIC PNL TOTAL CA: CPT

## 2017-09-28 NOTE — ERA
ER Documentation


Chief Complaint


Date/Time


DATE: 9/28/17 


TIME: 22:11


Chief Complaint


weakness for several days, dialyZED YESTERDAY, LEFT SUBCLAVIAN  CATHETER





HPI


Patient is a 76-year-old female with dialysis and atrial fibrillation who 

presents with a failed dialysis catheter.  The patient was here and admitted 

and had dialysis on Monday and had a dialysis catheter placed.  However on 

Wednesday the catheter did not work at dialysis.  She came back today because 

she is concerned that tomorrow she will not be able to have dialysis either.  

She feels tired, weak, and swollen.  Upon review of old medical records the 

patient had one previous visit this week with admission for dialysis catheter.





ROS


All systems reviewed and are negative except as per history of present illness.





Medications


Home Meds


Reported Medications


Nifedipine* (Nifedipine ER*) 90 Mg Tablet.sa, 90 MG PO BID, TAB.SA


   9/24/17


Sevelamer Hcl* (Renagel*) 800 Mg Tablet, 800 MG PO WITH MEALS, TAB


   9/24/17


Paroxetine Hcl* (Paroxetine*) 10 Mg Tablet, 10 MG PO DAILY, TAB


   9/24/17


Omeprazole* (Omeprazole*) 20 Mg Capsule.dr, 20 MG PO DAILY, #30 CAP


   9/24/17


Isosorbide Mononitrate* (Isosorbide Mononitrate*) 30 Mg Tab.er.24h, 30 MG PO 

DAILY, TAB


   9/24/17


Multivitamin (MULTI-VITAMIN DAILY) 1 Each Tablet, 1 TAB PO DAILY, TAB


   9/24/17


Omega-3/Dha/Epa/Fish Oil (FISH  MG SOFTGEL) 1 Each Capsule, 1 EACH PO BID

, CAP


   9/24/17


Aspirin* (Aspirin* EC) 81 Mg Tablet.dr, 81 MG PO DAILY, TAB


   9/24/17


Losartan Potassium* (Losartan Potassium*) 100 Mg Tablet, 100 MG PO DAILY, TAB


   9/24/17


Furosemide* (Lasix*) 40 Mg Tablet, 40 MG PO DAILY, TAB


   9/24/17


Carvedilol* (Carvedilol*) 6.25 Mg Tablet, 6.25 MG PO BID, #60 TAB


   9/24/17


Atorvastatin* (Atorvastatin*) 80 Mg Tablet, 80 MG PO QHS, #30 TAB


   9/24/17


Warfarin Sod (Coumadin) 2.5 Mg Tab, 2.5 MG PO, TAB


   SUNDAY, TUESDAY, THURSDAY, SATURDAY 9/24/17


Warfarin Sodium* (Coumadin*) 5 Mg Tablet, 5 MG PO MONWEDFRI, TAB


   9/24/17





Allergies


Allergies:  


Coded Allergies:  


     No Known Allergy (Unverified , 9/28/17)





PMhx/Soc


History of Surgery:  Yes (hysterectomy (40 years ago))


Anesthesia Reaction:  No


Hx Neurological Disorder:  No


Hx Respiratory Disorders:  No


Hx Cardiac Disorders:  Yes (Afib, HTN )


Hx Psychiatric Problems:  Yes (Anxiety)


Hx Miscellaneous Medical Probl:  Yes (HTn, DM,)


Hx Alcohol Use:  No


Hx Substance Use:  No


Hx Tobacco Use:  No


Smoking Status:  Never smoker





FmHx


Family History:  No diabetes





Physical Exam


Vitals





Vital Signs








  Date Time  Temp Pulse Resp B/P Pulse Ox O2 Delivery O2 Flow Rate FiO2


 


9/28/17 21:56  65 14 158/71 100 Room Air  


 


9/28/17 21:26 98.3 64 16 155/58 100 Room Air  


 


9/28/17 20:20 98.8 67 17 159/61 100 Room Air  


 


9/28/17 18:37 98.8 73 20 133/59 97   








Physical Exam


Const: No acute distress


Head:   Atraumatic 


Eyes:    Normal Conjunctiva


ENT:    Normal External Ears, Nose and Mouth.


Neck:               Full range of motion..~ No meningismus.


Resp:    Clear to auscultation bilaterally


Cardio:    Regular rate and rhythm, no murmurs


Abd:    Soft, non tender, non distended. Normal bowel sounds


Skin:    No petechiae or rashes


Back:    No midline or flank tenderness


Ext:    No cyanosis, or edema


Neur:    Awake and alert


Psych:    Normal Mood and Affect


Result Diagram:  


9/28/17 2055 9/28/17 2055





Results 24 hrs





 Laboratory Tests








Test


  9/28/17


20:55


 


White Blood Count 7.510^3/ul 


 


Red Blood Count 3.7510^6/ul 


 


Hemoglobin 10.3g/dl 


 


Hematocrit 33.4% 


 


Mean Corpuscular Volume 89.1fl 


 


Mean Corpuscular Hemoglobin 27.5pg 


 


Mean Corpuscular Hemoglobin


Concent 30.8g/dl 


 


 


Red Cell Distribution Width 14.3% 


 


Platelet Count 46765^3/UL 


 


Mean Platelet Volume 10.1fl 


 


Neutrophils % 54.3% 


 


Lymphocytes % 33.0% 


 


Monocytes % 7.7% 


 


Eosinophils % 3.4% 


 


Basophils % 0.7% 


 


Nucleated Red Blood Cells % 0.0/100WBC 


 


Neutrophils # 4.110^3/ul 


 


Lymphocytes # 2.510^3/ul 


 


Monocytes # 0.610^3/ul 


 


Eosinophils # 0.310^3/ul 


 


Basophils # 0.110^3/ul 


 


Nucleated Red Blood Cells # 0.010^3/ul 


 


Urine Color YELLOW 


 


Urine Clarity


  SLIGHTLY


CLOUDY


 


Urine pH 7.0 


 


Urine Specific Gravity 1.013 


 


Urine Ketones NEGATIVEmg/dL 


 


Urine Nitrite NEGATIVEmg/dL 


 


Urine Bilirubin NEGATIVEmg/dL 


 


Urine Urobilinogen NEGATIVEmg/dL 


 


Urine Leukocyte Esterase 3+Peter/ul 


 


Urine Microscopic RBC 6/HPF 


 


Urine Microscopic /HPF 


 


Urine Hemoglobin NEGATIVEmg/dL 


 


Urine Glucose NEGATIVEmg/dL 


 


Urine Total Protein 2+mg/dl 


 


Sodium Level 139mmol/L 


 


Potassium Level 4.7mmol/L 


 


Chloride Level 96mmol/L 


 


Carbon Dioxide Level 28mmol/L 


 


Anion Gap 20 


 


Blood Urea Nitrogen 53mg/dl 


 


Creatinine 5.11mg/dl 


 


Glucose Level 152mg/dl 


 


Calcium Level 9.5mg/dl 


 


Troponin I 0.012ng/ml 








 Current Medications








 Medications


  (Trade)  Dose


 Ordered  Sig/Baltazar


 Route


 PRN Reason  Start Time


 Stop Time Status Last Admin


Dose Admin


 


 Ondansetron HCl


  (Zofran Inj)  4 mg  BRIDGE ORDER PRN


 IV


 NAUSEA AND/OR VOMITING  9/28/17 21:00


 9/29/17 20:59   


 


 


 Acetaminophen


  (Tylenol Tab)  650 mg  ER BRIDGE PRN


 PO


 MILD PAIN/FEVER  9/28/17 21:00


 9/29/17 20:59   


 











Procedures/MDM


EKG read by me: 


Rate/Rhythm: Regular rate and rhythm at a normal rate


Intervals:    Normal


Impression:     No evidence of ischemia or arrhythmia





Chest x-ray is pending at this time.





Patient is a 76-year-old female with dialysis and atrial fibrillation who 

presents feeling weak, tired, and swollen.  She has missed dialysis yesterday.  

She will require dialysis.  However currently her potassium is 4.7.  She also 

has acute cystitis and I gave a dose of Cipro in the emergency department.  

Urine culture is pending.  The patient will be admitted to the care of Dr. Carlton 

from the panel team to a medical surgical bed.





Departure


Diagnosis:  


 Primary Impression:  


 Dialysis catheter clot or failure


 Additional Impression:  


 Acute weakness


Condition:  VILMA Gacria MD Sep 28, 2017 22:15

## 2017-09-29 VITALS — SYSTOLIC BLOOD PRESSURE: 136 MMHG | HEART RATE: 69 BPM | DIASTOLIC BLOOD PRESSURE: 65 MMHG

## 2017-09-29 VITALS — SYSTOLIC BLOOD PRESSURE: 121 MMHG | DIASTOLIC BLOOD PRESSURE: 67 MMHG | HEART RATE: 75 BPM

## 2017-09-29 VITALS — DIASTOLIC BLOOD PRESSURE: 70 MMHG | RESPIRATION RATE: 18 BRPM | SYSTOLIC BLOOD PRESSURE: 151 MMHG

## 2017-09-29 VITALS — DIASTOLIC BLOOD PRESSURE: 74 MMHG | SYSTOLIC BLOOD PRESSURE: 169 MMHG | RESPIRATION RATE: 18 BRPM | HEART RATE: 68 BPM

## 2017-09-29 VITALS — DIASTOLIC BLOOD PRESSURE: 61 MMHG | RESPIRATION RATE: 20 BRPM | SYSTOLIC BLOOD PRESSURE: 146 MMHG

## 2017-09-29 VITALS — DIASTOLIC BLOOD PRESSURE: 66 MMHG | HEART RATE: 75 BPM | SYSTOLIC BLOOD PRESSURE: 114 MMHG

## 2017-09-29 VITALS — DIASTOLIC BLOOD PRESSURE: 56 MMHG | HEART RATE: 64 BPM | SYSTOLIC BLOOD PRESSURE: 127 MMHG

## 2017-09-29 VITALS — HEART RATE: 67 BPM | SYSTOLIC BLOOD PRESSURE: 120 MMHG | DIASTOLIC BLOOD PRESSURE: 70 MMHG

## 2017-09-29 VITALS — SYSTOLIC BLOOD PRESSURE: 120 MMHG | HEART RATE: 67 BPM | DIASTOLIC BLOOD PRESSURE: 70 MMHG

## 2017-09-29 VITALS — DIASTOLIC BLOOD PRESSURE: 74 MMHG | HEART RATE: 70 BPM | SYSTOLIC BLOOD PRESSURE: 129 MMHG

## 2017-09-29 VITALS — HEART RATE: 78 BPM | DIASTOLIC BLOOD PRESSURE: 66 MMHG | SYSTOLIC BLOOD PRESSURE: 139 MMHG

## 2017-09-29 VITALS — SYSTOLIC BLOOD PRESSURE: 119 MMHG | DIASTOLIC BLOOD PRESSURE: 70 MMHG | HEART RATE: 75 BPM

## 2017-09-29 LAB
ALBUMIN SERPL-MCNC: 3.4 G/DL (ref 3.3–4.9)
ALBUMIN/GLOB SERPL: 1.06 {RATIO}
ALP SERPL-CCNC: 75 IU/L (ref 42–121)
ALT SERPL-CCNC: 27 IU/L (ref 13–69)
ANION GAP SERPL CALC-SCNC: 13 MMOL/L (ref 8–16)
AST SERPL-CCNC: 15 IU/L (ref 15–46)
BASOPHILS # BLD AUTO: 0 10^3/UL (ref 0–0.1)
BASOPHILS NFR BLD: 0.7 % (ref 0–2)
BILIRUB DIRECT SERPL-MCNC: 0 MG/DL (ref 0–0.2)
BILIRUB SERPL-MCNC: 0 MG/DL (ref 0.2–1.3)
BUN SERPL-MCNC: 62 MG/DL (ref 7–20)
CALCIUM SERPL-MCNC: 8.7 MG/DL (ref 8.4–10.2)
CHLORIDE SERPL-SCNC: 100 MMOL/L (ref 97–110)
CO2 SERPL-SCNC: 30 MMOL/L (ref 21–31)
CREAT SERPL-MCNC: 5.46 MG/DL (ref 0.44–1)
EOSINOPHIL # BLD: 0.3 10^3/UL (ref 0–0.5)
EOSINOPHIL NFR BLD: 4.5 % (ref 0–7)
ERYTHROCYTE [DISTWIDTH] IN BLOOD BY AUTOMATED COUNT: 14.4 % (ref 11.5–14.5)
GLOBULIN SER-MCNC: 3.2 G/DL (ref 1.3–3.2)
GLUCOSE SERPL-MCNC: 142 MG/DL (ref 70–220)
HCT VFR BLD CALC: 27.3 % (ref 37–47)
HGB BLD-MCNC: 8.3 G/DL (ref 12–16)
LYMPHOCYTES # BLD AUTO: 2.5 10^3/UL (ref 0.8–2.9)
LYMPHOCYTES NFR BLD AUTO: 42.4 % (ref 15–51)
MAGNESIUM SERPL-MCNC: 1.9 MG/DL (ref 1.7–2.5)
MCH RBC QN AUTO: 27.3 PG (ref 29–33)
MCHC RBC AUTO-ENTMCNC: 30.4 G/DL (ref 32–37)
MCV RBC AUTO: 89.8 FL (ref 82–101)
MONOCYTES # BLD: 0.6 10^3/UL (ref 0.3–0.9)
MONOCYTES NFR BLD: 9.5 % (ref 0–11)
NEUTROPHILS # BLD: 2.5 10^3/UL (ref 1.6–7.5)
NEUTROPHILS NFR BLD AUTO: 42.1 % (ref 39–77)
NRBC # BLD MANUAL: 0 10^3/UL (ref 0–0)
NRBC BLD AUTO-RTO: 0 /100WBC (ref 0–0)
PHOSPHATE SERPL-MCNC: 5.1 MG/DL (ref 2.5–4.9)
PLATELET # BLD: 237 10^3/UL (ref 140–415)
PMV BLD AUTO: 10 FL (ref 7.4–10.4)
POTASSIUM SERPL-SCNC: 4.4 MMOL/L (ref 3.5–5.1)
PROT SERPL-MCNC: 6.6 G/DL (ref 6.1–8.1)
RBC # BLD AUTO: 3.04 10^6/UL (ref 4.2–5.4)
SODIUM SERPL-SCNC: 139 MMOL/L (ref 135–144)
WBC # BLD AUTO: 6 10^3/UL (ref 4.8–10.8)

## 2017-09-29 PROCEDURE — 3E04317 INTRODUCTION OF OTHER THROMBOLYTIC INTO CENTRAL VEIN, PERCUTANEOUS APPROACH: ICD-10-PCS

## 2017-09-29 PROCEDURE — 5A1D70Z PERFORMANCE OF URINARY FILTRATION, INTERMITTENT, LESS THAN 6 HOURS PER DAY: ICD-10-PCS

## 2017-09-29 RX ADMIN — ATORVASTATIN CALCIUM SCH MG: 80 TABLET, FILM COATED ORAL at 21:01

## 2017-09-29 RX ADMIN — NIFEDIPINE SCH MG: 90 TABLET, FILM COATED, EXTENDED RELEASE ORAL at 08:48

## 2017-09-29 RX ADMIN — CIPROFLOXACIN HYDROCHLORIDE SCH MG: 500 TABLET, FILM COATED ORAL at 05:49

## 2017-09-29 RX ADMIN — LOSARTAN POTASSIUM SCH MG: 50 TABLET, FILM COATED ORAL at 08:49

## 2017-09-29 RX ADMIN — PANTOPRAZOLE SODIUM SCH MG: 40 TABLET, DELAYED RELEASE ORAL at 05:49

## 2017-09-29 RX ADMIN — FUROSEMIDE SCH MG: 40 TABLET ORAL at 08:48

## 2017-09-29 RX ADMIN — ASPIRIN 81 MG SCH MG: 81 TABLET ORAL at 08:47

## 2017-09-29 RX ADMIN — NIFEDIPINE SCH MG: 90 TABLET, FILM COATED, EXTENDED RELEASE ORAL at 01:25

## 2017-09-29 RX ADMIN — NIFEDIPINE SCH MG: 90 TABLET, FILM COATED, EXTENDED RELEASE ORAL at 21:44

## 2017-09-29 RX ADMIN — PAROXETINE SCH MG: 10 TABLET, FILM COATED ORAL at 08:47

## 2017-09-29 RX ADMIN — ISOSORBIDE MONONITRATE SCH MG: 30 TABLET, EXTENDED RELEASE ORAL at 21:44

## 2017-09-29 RX ADMIN — OMEGA-3 FATTY ACIDS CAP DELAYED RELEASE 1000 MG SCH MG: 1000 CAPSULE DELAYED RELEASE at 08:46

## 2017-09-29 NOTE — HP
Date/Time of Note


Date/Time of Note


DATE: 9/29/17 


TIME: 05:18





Assessment/Plan


VTE Prophylaxis


VTE Prophylaxis Intervention:  heparin





Lines/Catheters


IV Catheter Type (from [facility]):  [facility] CATH





Assessment/Plan


Assessment/Plan





1.  Recurrent right chest wall permacath malfunction


-Last dialysis 3 days ago.  Currently no indication for urgent dialysis


-Will reconsult vascular surgeon


-Nephrology consult





2. A-fib: Rate controlled


-Continue meds





3. Hypertension: BP not at goal


- Continue antihypertensives with adjustment as needed





4.  History of GERD


-Continue PPI





5.  Anemia of end-stage renal disease


-Hemoglobin stable.  Continue to monitor.  Transfuse as needed





HPI/ROS


Admit Date/Time


Admit Date/Time


Sep 28, 2017 at 20:39





Hx of Present Illness





Patient is a 76-year-old female with a history of hypertension, A-fib, GERD, end

-stage renal disease on dialysis who presented to the emergency department 

because of right chest walls permacath malfunction.  Last dialysis was 3 days 

ago on Monday.  Patient does not have any complain.  She denied shortness of 

breath, chest pain, nausea or vomiting.  Physical examination with no sign of 

volume overload.  Patient was just admitted here 3 days ago for same issue.  At 

that time, catheter was able to OR with TPA and as such she was dialyzed and 

was discharged with some future outpatient plan per vascular surgery.  At that 

time, ultrasound showed patent left radial artery to cephalic vein dialysis 

fistula. She had bilateral upper extremity vein mapping. 


When she presented to the ER, labs shows BUN of 53, creatinine 5.1, hemoglobin 

10.3 otherwise rest of CBC and BMP was in acceptable range.





PMH/Family/Social


Past Surgical History


Past Surgical Hx:  other





Social History


Smoking Status:  Never smoker





Exam/Review of Systems


Vital Signs


Vitals





 Vital Signs








  Date Time  Temp Pulse Resp B/P Pulse Ox O2 Delivery O2 Flow Rate FiO2


 


9/29/17 00:42 98.0 68 18 169/74 99 Room Air  











Exam


Constitutional:  alert, oriented, well developed


Head:  atraumatic, normocephalic


Eyes:  EOMI, PERRL


Respiratory:  clear to auscultation, normal air movement


Cardiovascular:  nl pulses, regular rate and rhythm


Gastrointestinal:  non-tender, soft


Extremities:  normal pulses, other (Previous left upper extremity AV fistula 

site with no thrill)





Labs


Result Diagram:  


9/28/17 2055 9/28/17 2055








Medications


Medications





 Current Medications


Atorvastatin Calcium (Lipitor) 80 mg HS PO ;  Start 9/29/17 at 21:00


Carvedilol (Coreg) 6.25 mg BID PO  Last administered on 9/29/17at 00:46; Admin 

Dose 6.25 MG;  Start 9/28/17 at 23:30


Furosemide (Lasix) 40 mg DAILY PO ;  Start 9/29/17 at 09:00


Losartan Potassium (Cozaar) 100 mg DAILY PO ;  Start 9/29/17 at 09:00


Aspirin (Aspirin) 81 mg DAILY PO ;  Start 9/29/17 at 09:00


Fish Oil (Fish Oil) 1,000 mg DAILY PO ;  Start 9/29/17 at 09:00


Isosorbide Mononitrate (Ismo) 20 mg QHS PO  Last administered on 9/29/17at 01:24

; Admin Dose 20 MG;  Start 9/28/17 at 23:30


Pantoprazole (Protonix Tab) 40 mg DAILY@06 PO ;  Start 9/29/17 at 06:00


Paroxetine HCl (Paxil) 10 mg DAILY PO ;  Start 9/29/17 at 09:00


Ondansetron HCl (Zofran Inj) 4 mg Q6H  PRN IV NAUSEA AND/OR VOMITING;  Start 9/ 28/17 at 23:30


Morphine Sulfate (morphine) 1 mg Q4H  PRN IV PAIN LEVEL 7-10;  Start 9/28/17 at 

23:30


Acetaminophen (Tylenol Tab) 650 mg Q4H  PRN PO PAIN AND OR ELEVATED TEMP;  

Start 9/28/17 at 23:30


Ciprofloxacin (Cipro) 500 mg DAILY@06 PO ;  Start 9/29/17 at 06:00;  Stop 10/2/

17 at 05:59


Nifedipine (Procardia Xl) 90 mg BID PO  Last administered on 9/29/17at 01:25; 

Admin Dose 90 MG;  Start 9/29/17 at 00:00


Diagnostic Test (Pha) (Accu-Chek) 1 ea 02 XX ;  Start 9/29/17 at 02:00


Miscellaneous Information 1 ea NOTE XX ;  Start 9/29/17 at 00:30


Glucose (Glutose) 15 gm Q15M  PRN PO DECREASED GLUCOSE;  Start 9/29/17 at 00:30


Glucose (Glutose) 22.5 gm Q15M  PRN PO DECREASED GLUCOSE;  Start 9/29/17 at 00:

30


Dextrose (D50w Syringe) 25 ml Q15M  PRN IV DECREASED GLUCOSE;  Start 9/29/17 at 

00:30


Dextrose (D50w Syringe) 50 ml Q15M  PRN IV DECREASED GLUCOSE;  Start 9/29/17 at 

00:30


Glucagon (Glucagen) 1 mg Q15M  PRN IM DECREASED GLUCOSE;  Start 9/29/17 at 00:30


Glucose (Glutose) 15 gm Q15M  PRN BUCCAL DECREASED GLUCOSE;  Start 9/29/17 at 00

:30


Influenza Virus Vaccine (Fluzone) 0.5 ml ONCE ONCE IM* ;  Start 9/30/17 at 09:00

;  Stop 9/30/17 at 09:01











SALMA ANGEL MD Sep 29, 2017 05:29

## 2017-09-29 NOTE — CONS
DATE OF ADMISSION:  09/28/2017

 

DATE OF CONSULTATION:  09/29/2017

 

REASON FOR CONSULTATION:  Preoperative evaluation, atrial

fibrillation.

 

REFERRING PHYSICIAN:  Dr. Kline from the Vascular Surgery

department.

 

HISTORY OF PRESENT ILLNESS:  Ms. Mason is a very pleasant 76-

year-old female with history of hypertension, atrial

fibrillation, gastroesophageal reflux disease, end-state renal

disease on hemodialysis who presents with right chest wall

PermCath malfunction for last hemodialysis three days prior.

Patient denied chest pain, shortness of breath upon arrival.

 

VITAL SIGNS:  Temperature 98.8, blood pressure 133/59, pulse 72,

respiratory rate 20, satting 97 percent.

 

LABORATORY:  White count of 7.5, hemoglobin 10.3, platelet count

278,000.  Sodium 139, potassium 4.7, creatinine 5.1, BUN of 53.

Troponin negative.  UA negative.

 

Patient was admitted to the floor, denies chest pain, shortness

of breath.  Patient has been consulted by Vascular Surgery, Dr. Kline with creation of an AV fistula.  I have been asked to

see the patient in preoperative evaluation.

 

PAST MEDICAL HISTORY:  As above in HPI.

 

MEDICATION:  Currently in hospital, atorvastatin 80 mg at

bedtime, Lasix 4 mg daily, Cozaar 100 mg daily, aspirin 81 mg

daily, fish oil 2 grams daily, Paxil 10 mg daily, insulin sliding

scale, Protonix 40 mg daily, ciprofloxacin 5 mg daily, insulin

sliding scale, carvedilol 6.25 mg p.o. b.i.d., Procardia XL 90 mg

b.i.d., Imdur 20 mg at bedtime.

 

ALLERGIES:  NO KNOWN DRUG ALLERGIES.

 

SOCIAL HISTORY:  No tobacco, ETOH or illicit drug use.

 

FAMILY HISTORY:  No history of sudden cardiac death or early CAD.

 

REVIEW OF SYSTEMS:  As above in HPI.

 

PHYSICAL EXAMINATION:

CONSTITUTIONAL:  No fevers or chills.

PULMONARY:  No current shortness of breath.

CARDIOVASCULAR:  No current chest pain.  History of atrial

fibrillation.

GASTROINTESTINAL:  No vomiting.

GENITOURINARY:  No hematuria.

MUSCULOSKELETAL:  Degenerative joint disease.

PSYCH:  No documented psych history.

NEUROLOGIC:  No documented history of CVA.

ENDOCRINE:  Diabetes mellitus.

 

PHYSICAL EXAMINATION:

VITAL SIGNS:  Temperature of 98.4, blood pressure 151/70, pulse

67, respiratory rate 19, satting 99 percent.

GENERAL:  The patient is alert, awake, no acute distress.

NECK:  JVP approximately 9-10 cm of water.

CHEST:  Mildly decreased breath sounds at the bases bilaterally.

HEART:  Regular rate and rhythm.  Normal S1 increased, S2; 1/6

systolic murmur.  Nondisplaced PMI.

ABDOMEN:  Positive bowel sounds.  Soft.

EXTREMITIES:  Trace edema, 1+ pulses bilaterally posterior

tibial.

 

LABORATORY:  As above in HPI.  Most recent from today, white

blood cell count 6.0, hemoglobin 8.3, platelet count 237,000.

Sodium 129, potassium 4.4, creatinine 5.4.  Hemoglobin A1c of

7.2.

 

IMAGING STUDIES:  As above in HPI.  No further imaging studies

for my review at this time.

 

ECG:  No further electrocardiograms for my review at this time.

 

IMPRESSION:

1.   Preoperative evaluation prior to creation of arteriovenous

  fistula.  After discussion with Vascular Surgery, may be done

  under local anesthesia.

2.   Hypertension.

3.   Dyslipidemia.

4.   Proximal atrial fibrillation.

5.   Diabetes mellitus.

6.   End-stage renal disease,

7.   Nonfunctioning PermCath.

8.   Urinary tract infection.

9.   Anemia.

 

RECOMMENDATIONS:

1.   At this time, would maintain patient on baseline

  antihypertensives with losartan, carvedilol, nifedipine, and

  Imdur with slight increase to improve overall systolic blood

  pressure control.

2.   Continue patient's antibiotics and follow up all culture

data.

3.   Will further titrate __________ if necessary.

4.   Will check a baseline EKG now and in the morning to assess

for baseline abnormalities and therefore any changes.

5.   Will send troponins every 6 hours x2 overnight to ensure

patient has not had any recent coronary syndromes __________

outcome of surgery, and if possible schedule patient for a 2D

echo to further assess patient's ejection fraction, wall motion,

and any major valve abnormalities prior to surgery.  Once again,

this patient likely is to go surgery under local anesthesia, then

patient's risks would therefore be significantly reduced and

would be okay to proceed after EKG analysis and if cardiac

enzymes return negative.

 

Thank you for allowing me to take part in the care of this

patient.  I will continue to follow him closely with you, and

recommendations made based on patient's hospital clinical course.

 

 

 

Dictated By:  Zay Allen MD

 

/feng/dave

Job#:  [id number]/Document#:  [id number]

D:  09/29/2017 20:02

T:  09/29/2017 20:28

CC:  Eduar Kline MD;*[facility]*

## 2017-09-29 NOTE — CONS
Date/Time of Note


Date/Time of Note


DATE: 9/29/17 


TIME: 11:30





Assessment/Plan


Assessment/Plan


Additional Assessment/Plan


1. Malfunctioning HD catheter, clotted LUE AVF-  missed HD


2. ESRD on HD


3. HTN


4. HL


5. Recurrent HD catheter issues





Plan :


continue current care


BP stable


will place tPA in cathter and have a HD nurse to check permcath, if works then 

we will plan for HD after it.


Due to recurrent admission for catheter related problems, I called  to 

change her Permacath and he will address her AVF as outpatient 





thanks for consultation, we will follow up





Consultation Date/Type/Reason


Admit Date/Time


Sep 28, 2017 at 20:39


Date of Consultation:  Sep 29, 2017


Type of Consultation:  NEPHROLOGY


Reason for Consultation


malfunctioning permcath catheter, missed HD, ESRD on HD


Referring Provider:  EDSON NAILS





Hx of Present Illness


76-year-old female with a history of hypertension, A-fib, GERD, end-stage renal 

disease on dialysis who presented to the emergency department because of right 

chest walls permacath malfunction.  Last dialysis was 3 days ago on Monday.  

Patient does not have any complain.  She denied shortness of breath, chest pain

, nausea or vomiting.  Physical examination with no sign of volume overload.  

Patient was just admitted here 3 days ago for same issue.  At that time, 

catheter was able to OR with TPA and as such she was dialyzed and was 

discharged with some future outpatient plan per vascular surgery.  At that time

, ultrasound showed patent left radial artery to cephalic vein dialysis 

fistula. She had bilateral upper extremity vein mapping. 


When she presented to the ER, labs shows BUN of 53, creatinine 5.1, hemoglobin 

10.3 otherwise rest of CBC and BMP was in acceptable range.


pt was recently admitted with similar malfunctioning catheter- on that visit, 

catheter worked with tPA infusion and was dicharged home.


Subjective hx not possible:  pt non-verbal


Constitutional:  no complaints


Eyes:  no complaints


ENT:  no complaints


Respiratory:  no complaints


Cardiovascular:  no complaints


Gastrointestinal:  no complaints


Genitourinary:  no complaints


Musculoskeletal:  no complaints


Skin:  no complaints


Neurologic:  no complaints


Endocrine:  no complaints


Lymphatic:  no complaints


Psychological:  no complaints


Immunologic:  no complaints





Past Medical History


Medical History:  high cholesterol, hypertension





Past Surgical History


Past Surgical Hx:  other (AVF surgery, permacath catheter placement)





Social History


Alcohol Use:  none


Smoking Status:  Never smoker


Drug Use:  none





Exam/Review of Systems


Vital Signs


Vitals





 Vital Signs








  Date Time  Temp Pulse Resp B/P Pulse Ox O2 Delivery O2 Flow Rate FiO2


 


9/29/17 08:32 98.4 69 18 151/70 99   


 


9/29/17 00:42      Room Air  














 Intake and Output   


 


 9/28/17 9/28/17 9/29/17





 15:00 23:00 07:00


 


Intake Total   50 ml


 


Balance   50 ml











Exam


Constitutional:  alert


Psych:  no complaints


Head:  normocephalic


Eyes:  nl conjunctiva


ENMT:  nl external ears & nose


Neck:  non-tender, supple


Respiratory:  clear to auscultation, diminished breath sounds, normal air 

movement


Cardiovascular:  regular rate and rhythm


Gastrointestinal:  non-tender, soft


Musculoskeletal:  nl extremities to inspection, nl gait and stance, other (

permacath catheter, LUE AVF in place )


Extremities:  normal pulses


Neurological:  CNS II-XII intact, nl mental status, nl speech, nl strength





Results


Result Diagram:  


9/29/17 0446                                                                   

             9/29/17 0446





Results 24 hrs





Laboratory Tests








Test


  9/28/17


20:55 9/28/17


23:09 9/29/17


04:46 9/29/17


08:34


 


White Blood Count 7.5    6.0   


 


Red Blood Count 3.75  L  3.04  L 


 


Hemoglobin 10.3  L  8.3  L 


 


Hematocrit 33.4  L  27.3  L 


 


Mean Corpuscular Volume 89.1    89.8   


 


Mean Corpuscular Hemoglobin 27.5  L  27.3  L 


 


Mean Corpuscular Hemoglobin


Concent 30.8  L


  


  30.4  L


  


 


 


Red Cell Distribution Width 14.3    14.4   


 


Platelet Count 278    237   


 


Mean Platelet Volume 10.1    10.0   


 


Neutrophils % 54.3    42.1   


 


Lymphocytes % 33.0    42.4   


 


Monocytes % 7.7    9.5   


 


Eosinophils % 3.4    4.5   


 


Basophils % 0.7    0.7   


 


Nucleated Red Blood Cells % 0.0    0.0   


 


Neutrophils # 4.1    2.5   


 


Lymphocytes # 2.5    2.5   


 


Monocytes # 0.6    0.6   


 


Eosinophils # 0.3    0.3   


 


Basophils # 0.1    0.0   


 


Nucleated Red Blood Cells # 0.0    0.0   


 


Urine Color YELLOW     


 


Urine Clarity


  SLIGHTLY


CLOUDY  A 


  


  


 


 


Urine pH 7.0     


 


Urine Specific Gravity 1.013     


 


Urine Ketones NEGATIVE     


 


Urine Nitrite NEGATIVE     


 


Urine Bilirubin NEGATIVE     


 


Urine Urobilinogen NEGATIVE     


 


Urine Leukocyte Esterase 3+  H   


 


Urine Microscopic RBC 6  H   


 


Urine Microscopic   H   


 


Urine Hemoglobin NEGATIVE     


 


Urine Glucose NEGATIVE     


 


Urine Total Protein 2+  H   


 


Sodium Level 139    139   


 


Potassium Level 4.7    4.4   


 


Chloride Level 96  L  100   


 


Carbon Dioxide Level 28    30   


 


Anion Gap 20  H  13  # 


 


Blood Urea Nitrogen 53  H  62  H 


 


Creatinine 5.11  H  5.46  H 


 


Glucose Level 152    142   


 


Calcium Level 9.5    8.7   


 


Troponin I 0.012     


 


Bedside Glucose  123    136  


 


Hemoglobin A1c   7.2  H 


 


Phosphorus Level   5.1  H 


 


Magnesium Level   1.9   


 


Total Bilirubin   0.0  L 


 


Direct Bilirubin   0.00   


 


Indirect Bilirubin   0.0   


 


Aspartate Amino Transf


(AST/SGOT) 


  


  15  


  


 


 


Alanine Aminotransferase


(ALT/SGPT) 


  


  27  


  


 


 


Alkaline Phosphatase   75   


 


Total Protein   6.6   


 


Albumin   3.4   


 


Globulin   3.20   


 


Albumin/Globulin Ratio   1.06   











Medications


Medications





 Current Medications


Atorvastatin Calcium (Lipitor) 80 mg HS PO ;  Start 9/29/17 at 21:00


Carvedilol (Coreg) 6.25 mg BID PO  Last administered on 9/29/17at 08:47; Admin 

Dose 6.25 MG;  Start 9/28/17 at 23:30


Furosemide (Lasix) 40 mg DAILY PO  Last administered on 9/29/17at 08:48; Admin 

Dose 40 MG;  Start 9/29/17 at 09:00


Losartan Potassium (Cozaar) 100 mg DAILY PO  Last administered on 9/29/17at 08:

49; Admin Dose 100 MG;  Start 9/29/17 at 09:00


Aspirin (Aspirin) 81 mg DAILY PO  Last administered on 9/29/17at 08:47; Admin 

Dose 81 MG;  Start 9/29/17 at 09:00


Fish Oil (Fish Oil) 1,000 mg DAILY PO  Last administered on 9/29/17at 08:46; 

Admin Dose 1,000 MG;  Start 9/29/17 at 09:00


Isosorbide Mononitrate (Ismo) 20 mg QHS PO  Last administered on 9/29/17at 01:24

; Admin Dose 20 MG;  Start 9/28/17 at 23:30


Pantoprazole (Protonix Tab) 40 mg DAILY@06 PO  Last administered on 9/29/17at 05

:49; Admin Dose 40 MG;  Start 9/29/17 at 06:00


Paroxetine HCl (Paxil) 10 mg DAILY PO  Last administered on 9/29/17at 08:47; 

Admin Dose 10 MG;  Start 9/29/17 at 09:00


Ondansetron HCl (Zofran Inj) 4 mg Q6H  PRN IV NAUSEA AND/OR VOMITING;  Start 9/ 28/17 at 23:30


Morphine Sulfate (morphine) 1 mg Q4H  PRN IV PAIN LEVEL 7-10;  Start 9/28/17 at 

23:30


Acetaminophen (Tylenol Tab) 650 mg Q4H  PRN PO PAIN AND OR ELEVATED TEMP;  

Start 9/28/17 at 23:30


Ciprofloxacin (Cipro) 500 mg DAILY@06 PO  Last administered on 9/29/17at 05:49; 

Admin Dose 500 MG;  Start 9/29/17 at 06:00;  Stop 10/2/17 at 05:59


Nifedipine (Procardia Xl) 90 mg BID PO  Last administered on 9/29/17at 08:48; 

Admin Dose 90 MG;  Start 9/29/17 at 00:00


Diagnostic Test (Pha) (Accu-Chek) 1 ea 02 XX ;  Start 9/29/17 at 02:00


Miscellaneous Information 1 ea NOTE XX ;  Start 9/29/17 at 00:30


Glucose (Glutose) 15 gm Q15M  PRN PO DECREASED GLUCOSE;  Start 9/29/17 at 00:30


Glucose (Glutose) 22.5 gm Q15M  PRN PO DECREASED GLUCOSE;  Start 9/29/17 at 00:

30


Dextrose (D50w Syringe) 25 ml Q15M  PRN IV DECREASED GLUCOSE;  Start 9/29/17 at 

00:30


Dextrose (D50w Syringe) 50 ml Q15M  PRN IV DECREASED GLUCOSE;  Start 9/29/17 at 

00:30


Glucagon (Glucagen) 1 mg Q15M  PRN IM DECREASED GLUCOSE;  Start 9/29/17 at 00:30


Glucose (Glutose) 15 gm Q15M  PRN BUCCAL DECREASED GLUCOSE;  Start 9/29/17 at 00

:30


Influenza Virus Vaccine (Fluzone) 0.5 ml ONCE ONCE IM* ;  Start 9/30/17 at 09:00

;  Stop 9/30/17 at 09:01











RUBY ZUNIGA MD Sep 29, 2017 11:30

## 2017-09-30 VITALS — RESPIRATION RATE: 18 BRPM | DIASTOLIC BLOOD PRESSURE: 67 MMHG | SYSTOLIC BLOOD PRESSURE: 128 MMHG | HEART RATE: 72 BPM

## 2017-09-30 VITALS — DIASTOLIC BLOOD PRESSURE: 94 MMHG | SYSTOLIC BLOOD PRESSURE: 166 MMHG | HEART RATE: 76 BPM | RESPIRATION RATE: 35 BRPM

## 2017-09-30 VITALS — HEART RATE: 69 BPM | SYSTOLIC BLOOD PRESSURE: 161 MMHG | RESPIRATION RATE: 18 BRPM | DIASTOLIC BLOOD PRESSURE: 66 MMHG

## 2017-09-30 VITALS — RESPIRATION RATE: 22 BRPM | SYSTOLIC BLOOD PRESSURE: 166 MMHG | HEART RATE: 76 BPM | DIASTOLIC BLOOD PRESSURE: 93 MMHG

## 2017-09-30 VITALS — RESPIRATION RATE: 20 BRPM | DIASTOLIC BLOOD PRESSURE: 72 MMHG | SYSTOLIC BLOOD PRESSURE: 151 MMHG

## 2017-09-30 VITALS — DIASTOLIC BLOOD PRESSURE: 113 MMHG | RESPIRATION RATE: 24 BRPM | HEART RATE: 78 BPM | SYSTOLIC BLOOD PRESSURE: 176 MMHG

## 2017-09-30 VITALS — SYSTOLIC BLOOD PRESSURE: 120 MMHG | HEART RATE: 65 BPM | RESPIRATION RATE: 18 BRPM | DIASTOLIC BLOOD PRESSURE: 57 MMHG

## 2017-09-30 VITALS — RESPIRATION RATE: 20 BRPM | SYSTOLIC BLOOD PRESSURE: 160 MMHG | DIASTOLIC BLOOD PRESSURE: 66 MMHG | HEART RATE: 76 BPM

## 2017-09-30 VITALS — SYSTOLIC BLOOD PRESSURE: 140 MMHG | HEART RATE: 72 BPM | RESPIRATION RATE: 18 BRPM | DIASTOLIC BLOOD PRESSURE: 68 MMHG

## 2017-09-30 VITALS — RESPIRATION RATE: 18 BRPM | SYSTOLIC BLOOD PRESSURE: 142 MMHG | HEART RATE: 73 BPM | DIASTOLIC BLOOD PRESSURE: 64 MMHG

## 2017-09-30 VITALS — RESPIRATION RATE: 25 BRPM | DIASTOLIC BLOOD PRESSURE: 83 MMHG | HEART RATE: 74 BPM | SYSTOLIC BLOOD PRESSURE: 163 MMHG

## 2017-09-30 VITALS — DIASTOLIC BLOOD PRESSURE: 77 MMHG | SYSTOLIC BLOOD PRESSURE: 167 MMHG | RESPIRATION RATE: 22 BRPM | HEART RATE: 77 BPM

## 2017-09-30 VITALS — SYSTOLIC BLOOD PRESSURE: 131 MMHG | RESPIRATION RATE: 20 BRPM | DIASTOLIC BLOOD PRESSURE: 60 MMHG

## 2017-09-30 VITALS — SYSTOLIC BLOOD PRESSURE: 156 MMHG | RESPIRATION RATE: 20 BRPM | DIASTOLIC BLOOD PRESSURE: 74 MMHG

## 2017-09-30 VITALS — HEART RATE: 70 BPM | DIASTOLIC BLOOD PRESSURE: 67 MMHG | RESPIRATION RATE: 18 BRPM | SYSTOLIC BLOOD PRESSURE: 126 MMHG

## 2017-09-30 LAB
ANION GAP SERPL CALC-SCNC: 15 MMOL/L (ref 8–16)
BUN SERPL-MCNC: 47 MG/DL (ref 7–20)
CALCIUM SERPL-MCNC: 9.2 MG/DL (ref 8.4–10.2)
CHLORIDE SERPL-SCNC: 99 MMOL/L (ref 97–110)
CHOLEST SERPL-MCNC: 96 MG/DL (ref 100–200)
CHOLEST/HDLC SERPL: 2.9 RATIO
CK MB SERPL-MCNC: < 0.22 NG/ML (ref 0–2.4)
CK SERPL-CCNC: 21 IU/L (ref 23–200)
CK SERPL-CCNC: 23 IU/L (ref 23–200)
CK SERPL-CCNC: < 20 IU/L (ref 23–200)
CO2 SERPL-SCNC: 28 MMOL/L (ref 21–31)
CREAT SERPL-MCNC: 4.81 MG/DL (ref 0.44–1)
GLUCOSE SERPL-MCNC: 196 MG/DL (ref 70–220)
HDLC SERPL-MCNC: 33 MG/DL (ref 33–92)
POTASSIUM SERPL-SCNC: 4.7 MMOL/L (ref 3.5–5.1)
SODIUM SERPL-SCNC: 137 MMOL/L (ref 135–144)
TRIGL SERPL-MCNC: 197 MG/DL (ref 0–149)
TROPONIN I SERPL-MCNC: 0.02 NG/ML (ref 0–0.12)
TROPONIN I SERPL-MCNC: 0.02 NG/ML (ref 0–0.12)
TROPONIN I SERPL-MCNC: < 0.012 NG/ML (ref 0–0.12)

## 2017-09-30 PROCEDURE — 05LF0ZZ OCCLUSION OF LEFT CEPHALIC VEIN, OPEN APPROACH: ICD-10-PCS | Performed by: SURGERY

## 2017-09-30 PROCEDURE — 03180ZD BYPASS LEFT BRACHIAL ARTERY TO UPPER ARM VEIN, OPEN APPROACH: ICD-10-PCS | Performed by: SURGERY

## 2017-09-30 RX ADMIN — CIPROFLOXACIN HYDROCHLORIDE SCH MG: 500 TABLET, FILM COATED ORAL at 06:00

## 2017-09-30 RX ADMIN — NIFEDIPINE SCH MG: 90 TABLET, FILM COATED, EXTENDED RELEASE ORAL at 21:30

## 2017-09-30 RX ADMIN — ATORVASTATIN CALCIUM SCH MG: 80 TABLET, FILM COATED ORAL at 21:29

## 2017-09-30 RX ADMIN — PANTOPRAZOLE SODIUM SCH MG: 40 TABLET, DELAYED RELEASE ORAL at 15:41

## 2017-09-30 RX ADMIN — ASPIRIN 81 MG SCH MG: 81 TABLET ORAL at 09:00

## 2017-09-30 RX ADMIN — FUROSEMIDE SCH MG: 40 TABLET ORAL at 15:38

## 2017-09-30 RX ADMIN — PAROXETINE SCH MG: 10 TABLET, FILM COATED ORAL at 15:37

## 2017-09-30 RX ADMIN — NIFEDIPINE SCH MG: 90 TABLET, FILM COATED, EXTENDED RELEASE ORAL at 15:38

## 2017-09-30 RX ADMIN — ISOSORBIDE MONONITRATE SCH MG: 30 TABLET, EXTENDED RELEASE ORAL at 21:35

## 2017-09-30 RX ADMIN — CIPROFLOXACIN HYDROCHLORIDE SCH MG: 500 TABLET, FILM COATED ORAL at 15:41

## 2017-09-30 RX ADMIN — OMEGA-3 FATTY ACIDS CAP DELAYED RELEASE 1000 MG SCH MG: 1000 CAPSULE DELAYED RELEASE at 09:00

## 2017-09-30 RX ADMIN — LOSARTAN POTASSIUM SCH MG: 50 TABLET, FILM COATED ORAL at 15:42

## 2017-09-30 RX ADMIN — PANTOPRAZOLE SODIUM SCH MG: 40 TABLET, DELAYED RELEASE ORAL at 06:00

## 2017-09-30 NOTE — OPR
Date/Time of Note


Date/Time of Note


DATE: 9/30/17 


TIME: 14:49





Operative Report


Free Text/Dictation


DATE OF OPERATION:  9/30/2017


 


SURGEON:  Eduar Lopez MD


 


PREOPERATIVE DIAGNOSIS:  ESRD


 


POSTOPERATIVE DIAGNOSIS:  Same


 


PROCEDURE:  


1. Ligation of left radiocephalic fistula


2. Creation of a left arm brachiocephalic arteriovenous fistula.


 


ANESTHESIA:  Regional Block and Local


 


COMPLICATIONS:  None. 


 


ESTIMATED BLOOD LOSS:  Minimal.  


 


TRANSFUSIONS: None





SPECIMEN:  None. 


 


INDICATIONS:  This is a 76-year-old female with a history of end-stage renal 

artery disease.   The risks and benefits of the procedure were discussed with 

the patient and not limited to death, MI, pneumonia, stroke, infection, 

thrombosis of graft and arteriovenous fistula, nerve injury, limb loss, 

revisions of AVF, steal and she elected to undergo surgical intervention.  


 


DESCRIPTION:   The patient was placed in supine position on the operating room 

table.  The arms were placed at 80 degrees.  The normal bony prominences were 

padded.  The anesthesia team had placed the appropriate lines and anesthesia 

was induced.  Time out performed and the appropriate site was marked and 

confirmed. The patient's upper extremity prepped and draped in the usual 

standard sterile fashion.  Preoperative antibiotics were administered prior to 

the skin incision since the patient already had been on antibiotics.  





A 2 cm incision was made on the prior surgical scar at the left wrist. Using 

scissors the subcutaneous tissue was dissected and the cephalic vein was 

identified near the arterial anastomosis. The vein was ligated using 3-0 silk 

suture. The incision was then checked for hemostasis which was adequate. The 

dermal layer was closed with 3-0 Vicryl suture and Dermabond was applied.





Attention was turned to the antecubital region and a 6 cm transverse skin 

incision was then performed below the antecubital fossa.  The cephalic vein was 

identified and dissected for a segment of nearly 5 cm.  Dissection was then 

carried as distally as possible through that incision.  Attention was then 

directed to the brachial artery.  The tendinous aponeurosis of the biceps 

muscle was then incised.  Location of the brachial artery was then identified 

by palpation.  The soft tissue over the brachial artery was then incised, and 

the brachial artery was then confirmed.  The patient was given 2500 units of 

heparin intravenously.


 


The cephalic vein was then ligated at its most distal end.  Yasargil clamps 

were then applied on the brachial artery, and a 6 mm incision in the anterior 

wall of the brachial artery was then performed.  The cephalic vein was then 

gently curved and allowed to lay over the arteriotomy.  The end of the vein was 

spatulated to match the side of the arteriotomy.  


 


The anastomosis was then performed using a running 6-0 Prolene suture.  At the 

completion of the suture line the brachial artery was forward-flushed and then 

allowed to backbleed.  The cephalic vein was also allowed to backbleed.  The 

anastomosis was irrigated with heparinized saline solution.  The suture was 

then tied and the suture line evaluated for hemostasis, which was adequate.  

There was evidence of excellent thrill in the cephalic vein.  There was a 

strong pulse palpable in the brachial, radial, and ulnar arteries at the wrist.

  There was no evidence of any kinks.  The subcutaneous tissue was then closed 

with a 3-0 Vicryl running suture and the skin closed with staples.  There was 

evidence of excellent thrill in the cephalic vein after the wound closure.  The 

patient tolerated the procedure well, was taken to the postanesthesia care unit 

in stable condition. All instruments, catheters, sponge, and needles were 

corrected x2.


Preoperative Diagnosis


ESRD


Postoperative Diagnosis


SAME


Operation/Procedure Performed


AS ABOVE


Surgeon


see signature line


Assistant


NONE


Anesthesia Type:  moderate sedation, other (block)


Anesthesiologist:  EDUAR LOPEZ MD


Estimated Blood Loss:  minimal


Transfusion


   none


Specimen


none


Grafts/Implants


none


Complications


none


Pt Condition Post Procedure:  stable


Disposition:  PACU


Procedure Description


AS ABOVE











EDUAR LOPEZ MD Sep 30, 2017 14:54

## 2017-09-30 NOTE — HPN
Date/Time of Note


Date/Time of Note


DATE: 9/30/17 


TIME: 12:01





Interval H&P Admission Note


Pt. seen H&P reviewed:  No system changes











GÓMEZ LOPEZ MD Sep 30, 2017 12:01

## 2017-09-30 NOTE — SIPON
Date/Time of Note


Date/Time of Note


DATE: 9/30/17 


TIME: 12:48





Operative Report


Preoperative Diagnosis


ESRD


Postoperative Diagnosis


SAME


Operation/Procedure Performed


LEFT ARM BRACHIOCEPHALIC FISTULA CREATION,


LIGATION OF LEFT RADIOCEPHALIC FISTULA


Surgeon


see signature line


First assist


NONE


Anesthesia:  moderate sedation, other (BLOCK)


Estimated blood loss:  minimal


Transfusion Required


   none


Specimen


NONE


Grafts/Implants


none


Complications


none











GÓMEZ LOPEZ MD Sep 30, 2017 12:49

## 2017-09-30 NOTE — RADRPT
Echocardiogram Report

 

Patient Name:  ALFONSO WILKERSON   Gender:       Female

MRN:           [id number]            Accession #:  [id number]

Birth Date:    1940        Study Date:   30-Sep-2017

Sonographer:   MAC                Location:     [facility]

Height(Cm):    160                Weight(Kg):   79

BSA:                                            1.87

 

Ref. Physician: HARJINDER ALLEN

Quality: Adequate

 

Procedures: Transthoracic echocardiogram with complete 2D, M-Mode, and 

Doppler examination.

Indications: Pre-op.

 

2D/M Mode                          Doppler

Measurement  Value  Normal Ranges  Measurement     Value  Normal Ranges

AoR Diam MM  3.2    cm             AV Peak Herminio     1.2    m/sec

ACS MM       1.6    cm             AV Peak PG      6.0    mmHg

LVIDd 2D     3.5    3.5 - 5.6 cm   LVOT Peak Herminio   0.9    m/sec

LVIDs 2D     2.0    2.1 - 4.1 cm   LVOT Peak PG    3.3    mmHg

LVPWd 2D     1.3    0.6 - 1.1 cm   MV E Peak Herminio   0.7    m/sec

IVSd 2D      1.8    0.6 - 1.1 cm   MV A Peak Herminio   0.9    m/sec

EDV 2D       49.4   cm3            MV E/A          0.8

ESV 2D       7.8    cm3            MV Decel Time   311    msec

LA Dimen 2D  37.0   2.3 - 4.0 cm   MV Decel [place]  2

MV E/A          0.8

PV Peak Herminio     0.7    m/sec

PV Peak PG      2.0    mmHg

 

Findings

Left Ventricle: Hyperdynamic left ventricular systolic function.  Normal 

left ventricular cavity size.  Mild to moderate concentric left 

ventricular hypertrophy.  Ejection fraction is visually estimated at >65 

%.  Tissue Doppler/Mitral Doppler indices are consistent with impaired 

relaxation (Stage I diastolic dysfunction).  E/E`=18.

Right Ventricle: Normal right ventricular size.  Normal right 

ventricular systolic function.

Left Atrium: There is moderate enlargement of left atrium.  LA Volume 

Index=43.

Right Atrium: The right atrium is normal in size.

Atrial Septum: Normal atrial septum.

Mitral Valve: Mild mitral leaflet calcification.  Mild mitral annular 

calcification.  Trace mitral regurgitation.

Aortic Valve: No significant aortic stenosis or insufficiency, mild 

thickening of leaflets.  Aortic cusps appear mildly calcified.  

Trileaflet aortic valve.

Tricuspid Valve: Normal appearance of the tricuspid valve.  There is 

trace tricuspid regurgitation.

Pulmonic Valve: Normal pulmonic valve appearance.  No evidence of 

pulmonic regurgitation.

Pericardium: Normal pericardium with no significant pericardial effusion.

Aorta: Normal aortic root.

IVC: Normal size and normal respiratory collapse consistent with normal 

right atrial pressure.

Pulmonary Artery: Normal pulmonary artery size.

 

Conclusions

1.Hyperdynamic left ventricular systolic function.  Normal left 

ventricular cavity size.  Mild to moderate concentric left ventricular 

hypertrophy.  Ejection fraction is visually estimated at >65 %.  Tissue 

Doppler/Mitral Doppler indices are consistent with impaired relaxation 

(Stage I diastolic dysfunction).  E/E`=18.

2.Mild mitral leaflet calcification.  Mild mitral annular 

calcification.  Trace mitral regurgitation.

3.No significant aortic stenosis or insufficiency, mild thickening of 

leaflets.  Aortic cusps appear mildly calcified.  Trileaflet aortic 

valve.

4.Normal appearance of the tricuspid valve.  There is trace tricuspid 

regurgitation.

 

Electronically Signed By:

Harjinder Allen

30-Sep-2017 14:50:26  -0700

 

Patient Name: ALFONSO WILKERSON

MRN: [id number]

Study Date: 30-Sep-2017

 

[id number]

## 2017-09-30 NOTE — CONS
Date/Time of Note


Date/Time of Note


DATE: 9/30/17 


TIME: 13:24





Assessment/Plan


Assessment/Plan


Chief Complaint/Hosp Course


IMPRESSION:


1.   Preoperative evaluation prior to creation of arteriovenous


  fistula.  After discussion with Vascular Surgery, may be done


  under local anesthesia.-negative trop x 3/ no sig change on serial ecg-ok to 

proceed to OR moderate risk


2.   Hypertension.


3.   Dyslipidemia.


4.   Proximal atrial fibrillation.


5.   Diabetes mellitus.


6.   End-stage renal disease,


7.   Nonfunctioning PermCath.


8.   Urinary tract infection.


9.   Anemia.





Recc:


-To OR today


-Continue imdur/procardia XL/cozzar and f/u BP closely


-Continue statin/asa/Fish oil


-Check post-op ecg


-HD for volume removal


Problems:  





Consultation Date/Type/Reason


Admit Date/Time


Sep 28, 2017 at 20:39


Initial Consult Date


9/29/17


Type of Consultation:  cardiology


Reason for Consultation


Pre-op


Referring Provider:  EDSON NAILS





Exam/Review of Systems


Vital Signs


Vitals





 Vital Signs








  Date Time  Temp Pulse Resp B/P Pulse Ox O2 Delivery O2 Flow Rate FiO2


 


9/30/17 08:30 98.4 70 20 156/74 100   


 


9/30/17 05:24      Room Air  














 Intake and Output   


 


 9/29/17 9/29/17 9/30/17





 15:00 23:00 07:00


 


Intake Total  1100 ml 350 ml


 


Output Total  3000 ml 150 ml


 


Balance  -1900 ml 200 ml











Exam


Review of Systems:


CONSTITUTIONAL:  No fevers, chills.


PULMONARY:  No sob


CARDIOVASCULAR: No chest pain/palpitations


GASTROINTESTINAL:  No nausea/vomiting.


GENITOURINARY:  No hematuria/dysuria.


MUSCULOSKELETAL:  No myagias/arthalgias.


PSYCHIATRIC:  The patient denies depression.


NEUROLOGIC:   No weakness


Constitutional:  alert


Psych:  no complaints


Head:  normocephalic


ENMT:  mucosa pink and moist


Respiratory:  clear to auscultation


Cardiovascular:  regular rate and rhythm


Gastrointestinal:  non-tender, soft


Musculoskeletal:  muscle tone (normal)


Extremities:  edema (none)


Neurological:  other (No focal deficits)





Results


Result Diagram:  


9/29/17 0446                                                                   

             9/30/17 0445





Results 24 hrs





Laboratory Tests








Test


  9/29/17


17:37 9/29/17


20:58 9/30/17


00:42 9/30/17


01:29


 


Bedside Glucose 163   199    163  


 


Creatine Kinase   23   


 


Creatine Kinase Index   1.0   


 


Creatinine Kinase MB (Mass)   < 0.22   


 


Troponin I   0.017   














Test


  9/30/17


04:45 9/30/17


05:12 9/30/17


08:38 


 


 


Sodium Level 137     


 


Potassium Level 4.7     


 


Chloride Level 99     


 


Carbon Dioxide Level 28     


 


Anion Gap 15     


 


Blood Urea Nitrogen 47  #H   


 


Creatinine 4.81  H   


 


Glucose Level 196     


 


Calcium Level 9.2     


 


Creatine Kinase < 20  L   


 


Creatine Kinase Index      


 


Creatinine Kinase MB (Mass) < 0.22     


 


Troponin I 0.019     


 


Triglycerides Level 197  H   


 


Cholesterol Level 96  L   


 


LDL Cholesterol, Calculated 24     


 


HDL Cholesterol 33     


 


Cholesterol/HDL Ratio 2.9     


 


Bedside Glucose  200   136   











Medications


Medications





 Current Medications


Atorvastatin Calcium (Lipitor) 80 mg HS PO  Last administered on 9/29/17at 21:01

; Admin Dose 80 MG;  Start 9/29/17 at 21:00


Carvedilol (Coreg) 6.25 mg BID PO  Last administered on 9/29/17at 21:01; Admin 

Dose 6.25 MG;  Start 9/28/17 at 23:30


Furosemide (Lasix) 40 mg DAILY PO  Last administered on 9/29/17at 08:48; Admin 

Dose 40 MG;  Start 9/29/17 at 09:00


Losartan Potassium (Cozaar) 100 mg DAILY PO  Last administered on 9/29/17at 08:

49; Admin Dose 100 MG;  Start 9/29/17 at 09:00


Aspirin (Aspirin) 81 mg DAILY PO  Last administered on 9/29/17at 08:47; Admin 

Dose 81 MG;  Start 9/29/17 at 09:00


Fish Oil (Fish Oil) 1,000 mg DAILY PO  Last administered on 9/29/17at 08:46; 

Admin Dose 1,000 MG;  Start 9/29/17 at 09:00


Pantoprazole (Protonix Tab) 40 mg DAILY@06 PO  Last administered on 9/29/17at 05

:49; Admin Dose 40 MG;  Start 9/29/17 at 06:00


Paroxetine HCl (Paxil) 10 mg DAILY PO  Last administered on 9/29/17at 08:47; 

Admin Dose 10 MG;  Start 9/29/17 at 09:00


Ondansetron HCl (Zofran Inj) 4 mg Q6H  PRN IV NAUSEA AND/OR VOMITING;  Start 9/ 28/17 at 23:30


Morphine Sulfate (morphine) 1 mg Q4H  PRN IV PAIN LEVEL 7-10;  Start 9/28/17 at 

23:30


Acetaminophen (Tylenol Tab) 650 mg Q4H  PRN PO PAIN AND OR ELEVATED TEMP;  

Start 9/28/17 at 23:30


Ciprofloxacin (Cipro) 500 mg DAILY@06 PO  Last administered on 9/29/17at 05:49; 

Admin Dose 500 MG;  Start 9/29/17 at 06:00;  Stop 10/2/17 at 05:59


Nifedipine (Procardia Xl) 90 mg BID PO  Last administered on 9/29/17at 21:44; 

Admin Dose 90 MG;  Start 9/29/17 at 00:00


Miscellaneous Information 1 ea NOTE XX ;  Start 9/29/17 at 00:30


Glucose (Glutose) 15 gm Q15M  PRN PO DECREASED GLUCOSE;  Start 9/29/17 at 00:30


Glucose (Glutose) 22.5 gm Q15M  PRN PO DECREASED GLUCOSE;  Start 9/29/17 at 00:

30


Dextrose (D50w Syringe) 25 ml Q15M  PRN IV DECREASED GLUCOSE;  Start 9/29/17 at 

00:30


Dextrose (D50w Syringe) 50 ml Q15M  PRN IV DECREASED GLUCOSE;  Start 9/29/17 at 

00:30


Glucagon (Glucagen) 1 mg Q15M  PRN IM DECREASED GLUCOSE;  Start 9/29/17 at 00:30


Glucose (Glutose) 15 gm Q15M  PRN BUCCAL DECREASED GLUCOSE;  Start 9/29/17 at 00

:30


Isosorbide Mononitrate (Imdur) 30 mg QHS PO  Last administered on 9/29/17at 21:

44; Admin Dose 30 MG;  Start 9/29/17 at 21:00


Clonidine (Catapres) 0.1 mg Q6H  PRN PO SBP>170;  Start 9/29/17 at 20:30


Insulin Aspart (Novolog Insulin Pen) (Adult SC Insulin - Mild Algorithm)... Q4 

SC  Last administered on 9/30/17at 05:14; Admin Dose 2 UNIT;  Start 9/30/17 at 

05:00











HARJINDER ROMANO Sep 30, 2017 13:30

## 2017-09-30 NOTE — PN
Date/Time of Note


Date/Time of Note


DATE: 9/30/17 


TIME: 15:01





Assessment/Plan


VTE Prophylaxis


VTE Prophylaxis Intervention:  heparin





Lines/Catheters


IV Catheter Type (from [facility]):  perm-a-cath





Assessment/Plan


Chief Complaint/Hosp Course


75 yo female with h/o ESRD who presented for non-functioning permacath


- Vascular access per Dr Witt


- ESRD: HD per renal


Problems:  





Subjective


24 Hr Interval Summary


Free Text/Dictation


Patient in OR for HD access procedure, not seen





Exam/Review of Systems


Vital Signs


Vitals





 Vital Signs








  Date Time  Temp Pulse Resp B/P Pulse Ox O2 Delivery O2 Flow Rate FiO2


 


9/30/17 14:55  76 35 166/94 95 Nasal Cannula 2.0 


 


9/30/17 14:50 97.7       














 Intake and Output   


 


 9/29/17 9/29/17 9/30/17





 15:00 23:00 07:00


 


Intake Total  1100 ml 350 ml


 


Output Total  3000 ml 150 ml


 


Balance  -1900 ml 200 ml











Results


Result Diagram:  


9/29/17 0446                                                                   

             9/30/17 0445





Results 24 hrs





Laboratory Tests








Test


  9/29/17


17:37 9/29/17


20:58 9/30/17


00:42 9/30/17


01:29


 


Bedside Glucose 163   199    163  


 


Creatine Kinase   23   


 


Creatine Kinase Index   1.0   


 


Creatinine Kinase MB (Mass)   < 0.22   


 


Troponin I   0.017   














Test


  9/30/17


04:45 9/30/17


05:12 9/30/17


08:38 


 


 


Sodium Level 137     


 


Potassium Level 4.7     


 


Chloride Level 99     


 


Carbon Dioxide Level 28     


 


Anion Gap 15     


 


Blood Urea Nitrogen 47  #H   


 


Creatinine 4.81  H   


 


Glucose Level 196     


 


Calcium Level 9.2     


 


Creatine Kinase < 20  L   


 


Creatine Kinase Index      


 


Creatinine Kinase MB (Mass) < 0.22     


 


Troponin I 0.019     


 


Triglycerides Level 197  H   


 


Cholesterol Level 96  L   


 


LDL Cholesterol, Calculated 24     


 


HDL Cholesterol 33     


 


Cholesterol/HDL Ratio 2.9     


 


Bedside Glucose  200   136   











Medications


Medications





 Current Medications


Atorvastatin Calcium (Lipitor) 80 mg HS PO  Last administered on 9/29/17at 21:01

; Admin Dose 80 MG;  Start 9/29/17 at 21:00


Carvedilol (Coreg) 6.25 mg BID PO  Last administered on 9/29/17at 21:01; Admin 

Dose 6.25 MG;  Start 9/28/17 at 23:30


Furosemide (Lasix) 40 mg DAILY PO  Last administered on 9/29/17at 08:48; Admin 

Dose 40 MG;  Start 9/29/17 at 09:00


Losartan Potassium (Cozaar) 100 mg DAILY PO  Last administered on 9/29/17at 08:

49; Admin Dose 100 MG;  Start 9/29/17 at 09:00


Aspirin (Aspirin) 81 mg DAILY PO  Last administered on 9/29/17at 08:47; Admin 

Dose 81 MG;  Start 9/29/17 at 09:00


Fish Oil (Fish Oil) 1,000 mg DAILY PO  Last administered on 9/29/17at 08:46; 

Admin Dose 1,000 MG;  Start 9/29/17 at 09:00


Pantoprazole (Protonix Tab) 40 mg DAILY@06 PO  Last administered on 9/29/17at 05

:49; Admin Dose 40 MG;  Start 9/29/17 at 06:00


Paroxetine HCl (Paxil) 10 mg DAILY PO  Last administered on 9/29/17at 08:47; 

Admin Dose 10 MG;  Start 9/29/17 at 09:00


Ondansetron HCl (Zofran Inj) 4 mg Q6H  PRN IV NAUSEA AND/OR VOMITING;  Start 9/ 28/17 at 23:30


Morphine Sulfate (morphine) 1 mg Q4H  PRN IV PAIN LEVEL 7-10;  Start 9/28/17 at 

23:30


Acetaminophen (Tylenol Tab) 650 mg Q4H  PRN PO PAIN AND OR ELEVATED TEMP;  

Start 9/28/17 at 23:30


Ciprofloxacin (Cipro) 500 mg DAILY@06 PO  Last administered on 9/29/17at 05:49; 

Admin Dose 500 MG;  Start 9/29/17 at 06:00;  Stop 10/2/17 at 05:59


Nifedipine (Procardia Xl) 90 mg BID PO  Last administered on 9/29/17at 21:44; 

Admin Dose 90 MG;  Start 9/29/17 at 00:00


Miscellaneous Information 1 ea NOTE XX ;  Start 9/29/17 at 00:30


Glucose (Glutose) 15 gm Q15M  PRN PO DECREASED GLUCOSE;  Start 9/29/17 at 00:30


Glucose (Glutose) 22.5 gm Q15M  PRN PO DECREASED GLUCOSE;  Start 9/29/17 at 00:

30


Dextrose (D50w Syringe) 25 ml Q15M  PRN IV DECREASED GLUCOSE;  Start 9/29/17 at 

00:30


Dextrose (D50w Syringe) 50 ml Q15M  PRN IV DECREASED GLUCOSE;  Start 9/29/17 at 

00:30


Glucagon (Glucagen) 1 mg Q15M  PRN IM DECREASED GLUCOSE;  Start 9/29/17 at 00:30


Glucose (Glutose) 15 gm Q15M  PRN BUCCAL DECREASED GLUCOSE;  Start 9/29/17 at 00

:30


Isosorbide Mononitrate (Imdur) 30 mg QHS PO  Last administered on 9/29/17at 21:

44; Admin Dose 30 MG;  Start 9/29/17 at 21:00


Clonidine (Catapres) 0.1 mg Q6H  PRN PO SBP>170;  Start 9/29/17 at 20:30


Insulin Aspart (Novolog Insulin Pen) (Adult SC Insulin - Mild Algorithm)... Q4 

SC  Last administered on 9/30/17at 05:14; Admin Dose 2 UNIT;  Start 9/30/17 at 

05:00











MICHAEL CABRERA MD Sep 30, 2017 15:02

## 2017-09-30 NOTE — CONS
Date/Time of Note


Date/Time of Note


DATE: 9/30/17 


TIME: 14:40





Assessment/Plan


Assessment/Plan


Additional Assessment/Plan


This is a 76-year-old female with  past history of hypertension, A-fib, GERD, 

end-stage renal disease on dialysis was admitted  with right chest walls 

permacath malfunction-  BUN of 53, creatinine 5.1, hemoglobin 10.3 otherwise 

rest of CBC and BMP was in acceptable range.Last dialysis was on Monday.  

Patient is off floor - went to OR for HD access. dw staff





Assessment/Plan


1. Malfunctioning HD catheter, clotted LUE AVF-  missed HD.ESRD on HD, Regular 

schedule is MWF 


2. SP Ligation of left radiocephalic fistula.  Creation of a left arm 

brachiocephalic arteriovenous fistula.


   - per vascular sx


   - Dressing Dry and Intact


2. ESRD on HD


3. HTN


4. HL


5. Recurrent HD catheter issues





Plan :


-continue current care


-BP stable


-we will follow up


- to FU with vascular sx  as recommended


Dw Dr LYNDON Thomason











Consultation Date/Type/Reason


Admit Date/Time


Sep 28, 2017 at 20:39


Initial Consult Date


9/29/17


Type of Consultation:  cardiology


Referring Provider:  EDSON NAILS





24 HR Interval Summary


Free Text/Dictation


Patient off floor for her HD access per staff


- Patient seen at 1700- back after surgical intervention. Denies any complaints 

at present.


- Family at bed side- all Qs answered. dw staff





Exam/Review of Systems


Vital Signs


Vitals





 Vital Signs








  Date Time  Temp Pulse Resp B/P Pulse Ox O2 Delivery O2 Flow Rate FiO2


 


9/30/17 08:30 98.4 70 20 156/74 100   


 


9/30/17 05:24      Room Air  














 Intake and Output   


 


 9/29/17 9/29/17 9/30/17





 15:00 23:00 07:00


 


Intake Total  1100 ml 350 ml


 


Output Total  3000 ml 150 ml


 


Balance  -1900 ml 200 ml











Exam


Constitutional:  alert, oriented, well developed


Respiratory:  diminished breath sounds, normal air movement


Cardiovascular:  nl pulses, regular rate and rhythm


Gastrointestinal:  non-tender, soft


Musculoskeletal:  other (sp LUE- HD access placement)


Neurological:  nl mental status, nl speech


Skin:  other





Results


Result Diagram:  


9/29/17 0446                                                                   

             9/30/17 0445





Results 24 hrs





Laboratory Tests








Test


  9/29/17


17:37 9/29/17


20:58 9/30/17


00:42 9/30/17


01:29


 


Bedside Glucose 163   199    163  


 


Creatine Kinase   23   


 


Creatine Kinase Index   1.0   


 


Creatinine Kinase MB (Mass)   < 0.22   


 


Troponin I   0.017   














Test


  9/30/17


04:45 9/30/17


05:12 9/30/17


08:38 


 


 


Sodium Level 137     


 


Potassium Level 4.7     


 


Chloride Level 99     


 


Carbon Dioxide Level 28     


 


Anion Gap 15     


 


Blood Urea Nitrogen 47  #H   


 


Creatinine 4.81  H   


 


Glucose Level 196     


 


Calcium Level 9.2     


 


Creatine Kinase < 20  L   


 


Creatine Kinase Index      


 


Creatinine Kinase MB (Mass) < 0.22     


 


Troponin I 0.019     


 


Triglycerides Level 197  H   


 


Cholesterol Level 96  L   


 


LDL Cholesterol, Calculated 24     


 


HDL Cholesterol 33     


 


Cholesterol/HDL Ratio 2.9     


 


Bedside Glucose  200   136   











Medications


Medications





 Current Medications


Atorvastatin Calcium (Lipitor) 80 mg HS PO  Last administered on 9/29/17at 21:01

; Admin Dose 80 MG;  Start 9/29/17 at 21:00


Carvedilol (Coreg) 6.25 mg BID PO  Last administered on 9/29/17at 21:01; Admin 

Dose 6.25 MG;  Start 9/28/17 at 23:30


Furosemide (Lasix) 40 mg DAILY PO  Last administered on 9/29/17at 08:48; Admin 

Dose 40 MG;  Start 9/29/17 at 09:00


Losartan Potassium (Cozaar) 100 mg DAILY PO  Last administered on 9/29/17at 08:

49; Admin Dose 100 MG;  Start 9/29/17 at 09:00


Aspirin (Aspirin) 81 mg DAILY PO  Last administered on 9/29/17at 08:47; Admin 

Dose 81 MG;  Start 9/29/17 at 09:00


Fish Oil (Fish Oil) 1,000 mg DAILY PO  Last administered on 9/29/17at 08:46; 

Admin Dose 1,000 MG;  Start 9/29/17 at 09:00


Pantoprazole (Protonix Tab) 40 mg DAILY@06 PO  Last administered on 9/29/17at 05

:49; Admin Dose 40 MG;  Start 9/29/17 at 06:00


Paroxetine HCl (Paxil) 10 mg DAILY PO  Last administered on 9/29/17at 08:47; 

Admin Dose 10 MG;  Start 9/29/17 at 09:00


Ondansetron HCl (Zofran Inj) 4 mg Q6H  PRN IV NAUSEA AND/OR VOMITING;  Start 9/ 28/17 at 23:30


Morphine Sulfate (morphine) 1 mg Q4H  PRN IV PAIN LEVEL 7-10;  Start 9/28/17 at 

23:30


Acetaminophen (Tylenol Tab) 650 mg Q4H  PRN PO PAIN AND OR ELEVATED TEMP;  

Start 9/28/17 at 23:30


Ciprofloxacin (Cipro) 500 mg DAILY@06 PO  Last administered on 9/29/17at 05:49; 

Admin Dose 500 MG;  Start 9/29/17 at 06:00;  Stop 10/2/17 at 05:59


Nifedipine (Procardia Xl) 90 mg BID PO  Last administered on 9/29/17at 21:44; 

Admin Dose 90 MG;  Start 9/29/17 at 00:00


Miscellaneous Information 1 ea NOTE XX ;  Start 9/29/17 at 00:30


Glucose (Glutose) 15 gm Q15M  PRN PO DECREASED GLUCOSE;  Start 9/29/17 at 00:30


Glucose (Glutose) 22.5 gm Q15M  PRN PO DECREASED GLUCOSE;  Start 9/29/17 at 00:

30


Dextrose (D50w Syringe) 25 ml Q15M  PRN IV DECREASED GLUCOSE;  Start 9/29/17 at 

00:30


Dextrose (D50w Syringe) 50 ml Q15M  PRN IV DECREASED GLUCOSE;  Start 9/29/17 at 

00:30


Glucagon (Glucagen) 1 mg Q15M  PRN IM DECREASED GLUCOSE;  Start 9/29/17 at 00:30


Glucose (Glutose) 15 gm Q15M  PRN BUCCAL DECREASED GLUCOSE;  Start 9/29/17 at 00

:30


Isosorbide Mononitrate (Imdur) 30 mg QHS PO  Last administered on 9/29/17at 21:

44; Admin Dose 30 MG;  Start 9/29/17 at 21:00


Clonidine (Catapres) 0.1 mg Q6H  PRN PO SBP>170;  Start 9/29/17 at 20:30


Insulin Aspart (Novolog Insulin Pen) (Adult SC Insulin - Mild Algorithm)... Q4 

SC  Last administered on 9/30/17at 05:14; Admin Dose 2 UNIT;  Start 9/30/17 at 

05:00











TIMMY BURNETT Sep 30, 2017 14:48

## 2017-10-01 VITALS — RESPIRATION RATE: 20 BRPM | SYSTOLIC BLOOD PRESSURE: 149 MMHG | DIASTOLIC BLOOD PRESSURE: 69 MMHG

## 2017-10-01 VITALS — SYSTOLIC BLOOD PRESSURE: 125 MMHG | DIASTOLIC BLOOD PRESSURE: 66 MMHG | HEART RATE: 70 BPM

## 2017-10-01 VITALS — DIASTOLIC BLOOD PRESSURE: 63 MMHG | HEART RATE: 70 BPM | SYSTOLIC BLOOD PRESSURE: 119 MMHG

## 2017-10-01 VITALS — HEART RATE: 73 BPM | SYSTOLIC BLOOD PRESSURE: 130 MMHG | DIASTOLIC BLOOD PRESSURE: 69 MMHG

## 2017-10-01 VITALS — SYSTOLIC BLOOD PRESSURE: 130 MMHG | DIASTOLIC BLOOD PRESSURE: 65 MMHG | HEART RATE: 74 BPM

## 2017-10-01 VITALS — HEART RATE: 65 BPM | SYSTOLIC BLOOD PRESSURE: 121 MMHG | DIASTOLIC BLOOD PRESSURE: 69 MMHG

## 2017-10-01 VITALS — HEART RATE: 72 BPM | SYSTOLIC BLOOD PRESSURE: 115 MMHG | DIASTOLIC BLOOD PRESSURE: 68 MMHG

## 2017-10-01 VITALS — HEART RATE: 77 BPM | DIASTOLIC BLOOD PRESSURE: 63 MMHG | SYSTOLIC BLOOD PRESSURE: 109 MMHG

## 2017-10-01 VITALS — DIASTOLIC BLOOD PRESSURE: 53 MMHG | SYSTOLIC BLOOD PRESSURE: 115 MMHG | RESPIRATION RATE: 20 BRPM

## 2017-10-01 RX ADMIN — NIFEDIPINE SCH MG: 90 TABLET, FILM COATED, EXTENDED RELEASE ORAL at 08:30

## 2017-10-01 RX ADMIN — CIPROFLOXACIN HYDROCHLORIDE SCH MG: 500 TABLET, FILM COATED ORAL at 05:21

## 2017-10-01 RX ADMIN — PAROXETINE SCH MG: 10 TABLET, FILM COATED ORAL at 08:29

## 2017-10-01 RX ADMIN — FUROSEMIDE SCH MG: 40 TABLET ORAL at 08:29

## 2017-10-01 RX ADMIN — ASPIRIN 81 MG SCH MG: 81 TABLET ORAL at 08:27

## 2017-10-01 RX ADMIN — PANTOPRAZOLE SODIUM SCH MG: 40 TABLET, DELAYED RELEASE ORAL at 05:21

## 2017-10-01 RX ADMIN — OMEGA-3 FATTY ACIDS CAP DELAYED RELEASE 1000 MG SCH MG: 1000 CAPSULE DELAYED RELEASE at 08:29

## 2017-10-01 RX ADMIN — LOSARTAN POTASSIUM SCH MG: 50 TABLET, FILM COATED ORAL at 08:28

## 2017-10-01 NOTE — DS
Date/Time of Note


Date/Time of Note


DATE: 10/1/17 


TIME: 11:49





Discharge Summary


Admission/Discharge Info


Admit Date/Time


Sep 28, 2017 at 20:39


Discharge Date/Time


October 1, 2017


Discharge Diagnosis


Dialysis access failure: ESRD; Caries; atrial fibrillation MI: Hypertension; 

gastroesophageal reflux disease


Patient Condition:  Good


Consults


Vascular surgery-Dr. Kline; nephrology-Dr. Thomason


Procedures


Repair of AV fistula; hemodialysis


Hx of Present Illness





Patient is a 76-year-old female with a history of hypertension, A-fib, GERD, end

-stage renal disease on dialysis who presented to the emergency department 

because of right chest walls permacath malfunction.  Last dialysis was 3 days 

ago on Monday.  Patient does not have any complain.  She denied shortness of 

breath, chest pain, nausea or vomiting.  Physical examination with no sign of 

volume overload.  Patient was just admitted here 3 days ago for same issue.  At 

that time, catheter was able to OR with TPA and as such she was dialyzed and 

was discharged with some future outpatient plan per vascular surgery.  At that 

time, ultrasound showed patent left radial artery to cephalic vein dialysis 

fistula. She had bilateral upper extremity vein mapping. 


When she presented to the ER, labs shows BUN of 53, creatinine 5.1, hemoglobin 

10.3 otherwise rest of CBC and BMP was in acceptable range.


Hospital Course


77 yo female with h/o ESRD who presented for non-functioning permacath


- Vascular access per Dr Witt


- ESRD: HD per renal





76-year-old [place] woman admitted with failure of her left antecubital fossa 

AV dialysis access.  She was taken to the operating room and had repair of 

this.  She is now had dialysis successfully.  As such she is stable for 

discharge in improved condition as compared to the time of admission.  She has 

no known communicable diseases she does not hazard to herself or others she is 

competent for medical decision-making her rehabilitation potential is good.


Home Meds


Reported Medications


Nifedipine* (Nifedipine ER*) 90 Mg Tablet.sa, 90 MG PO BID, TAB.SA


   9/24/17


Sevelamer Hcl* (Renagel*) 800 Mg Tablet, 800 MG PO WITH MEALS, TAB


   9/24/17


Paroxetine Hcl* (Paroxetine*) 10 Mg Tablet, 10 MG PO DAILY, TAB


   9/24/17


Omeprazole* (Omeprazole*) 20 Mg Capsule., 20 MG PO DAILY, #30 CAP


   9/24/17


Isosorbide Mononitrate* (Isosorbide Mononitrate*) 30 Mg Tab.er.24h, 30 MG PO 

DAILY, TAB


   9/24/17


Multivitamin (MULTI-VITAMIN DAILY) 1 Each Tablet, 1 TAB PO DAILY, TAB


   9/24/17


Omega-3/Dha/Epa/Fish Oil (FISH  MG SOFTGEL) 1 Each Capsule, 1 EACH PO BID

, CAP


   9/24/17


Aspirin* (Aspirin* EC) 81 Mg Tablet.dr, 81 MG PO DAILY, TAB


   9/24/17


Losartan Potassium* (Losartan Potassium*) 100 Mg Tablet, 100 MG PO DAILY, TAB


   9/24/17


Furosemide* (Lasix*) 40 Mg Tablet, 40 MG PO DAILY, TAB


   9/24/17


Carvedilol* (Carvedilol*) 6.25 Mg Tablet, 6.25 MG PO BID, #60 TAB


   9/24/17


Atorvastatin* (Atorvastatin*) 80 Mg Tablet, 80 MG PO QHS, #30 TAB


   9/24/17


Warfarin Sod (Coumadin) 2.5 Mg Tab, 2.5 MG PO, TAB


   SUNDAY, TUESDAY, THURSDAY, SATURDAY 9/24/17


Warfarin Sodium* (Coumadin*) 5 Mg Tablet, 5 MG PO MONWEDFRI, TAB


   9/24/17


Primary Care Provider


Tc Chávez MD


Time spent on discharge:   > 30 minutes


Pending Labs





Laboratory Tests








Test


  9/30/17


15:03 9/30/17


17:06 9/30/17


17:51 9/30/17


21:38


 


Bedside Glucose


  173mg/dL


() 


  139mg/dL


() 189mg/dL


()


 


Creatine Kinase


  


  21IU/L


() 


  


 


 


Creatine Kinase Index     


 


Creatinine Kinase MB (Mass)


  


  < 0.22ng/ml


(0.0-2.4) 


  


 


 


Troponin I


  


  < 0.012ng/ml


(0.00-0.12) 


  


 














Test


  10/1/17


01:59 10/1/17


08:24 


  


 


 


Bedside Glucose


  136mg/dL


() 177mg/dL


() 


  


 

















ARIAS JACKSON MD Oct 1, 2017 11:54

## 2017-10-01 NOTE — PDOCDIS
Discharge Instructions


DIAGNOSIS


Discharge Diagnosis


Dialysis access failure: ESRD; Caries; atrial fibrillation MI: Hypertension; 

gastroesophageal reflux disease





CONDITION


Patient Condition:  Good





HOME CARE INSTRUCTIONS:


Special Diet:  renal diet





ACTIVITY:








Activity Restrictions:   Slowly Increase Activity











FOLLOW UP/APPOINTMENTS


Follow-up Plan


With hemodialysis as per routine schedule; with vascular surgery in 1 week











ARIAS JACKSON MD Oct 1, 2017 11:54

## 2017-10-01 NOTE — CONS
Date/Time of Note


Date/Time of Note


DATE: 10/1/17 


TIME: 13:08





Assessment/Plan


Assessment/Plan


Chief Complaint/Hosp Course


IMPRESSION:


1.   Preoperative evaluation prior to creation of arteriovenous


  fistula.  After discussion with Vascular Surgery, may be done


  under local anesthesia.-negative trop x 3/ no sig change on serial ecg-ok to 

proceed to OR moderate risk- NO POD#1 s/p creastion of AVF with complications


2.   Hypertension-mildly elevated and labile


3.   Dyslipidemia.


4.   Proximal atrial fibrillation.


5.   Diabetes mellitus.


6.   End-stage renal disease,


7.   Nonfunctioning PermCath.


8.   Urinary tract infection.


9.   Anemia.





Recc:


-Continue imdur/procardia XL/cozzar and f/u BP closely 


-Continue statin/asa/Fish oil


-HD for volume removal


-Pain control as necessary


Problems:  





Consultation Date/Type/Reason


Admit Date/Time


Sep 28, 2017 at 20:39


Initial Consult Date


9/29/17


Type of Consultation:  cardiology


Reason for Consultation


Pre-op


Referring Provider:  EDSON NAILS





Exam/Review of Systems


Vital Signs


Vitals





 Vital Signs








  Date Time  Temp Pulse Resp B/P Pulse Ox O2 Delivery O2 Flow Rate FiO2


 


10/1/17 08:34 98.1 76 20 149/69 97   


 


9/30/17 16:45      Nasal Cannula 2.0 














 Intake and Output   


 


 9/30/17 9/30/17 10/1/17





 15:00 23:00 07:00


 


Intake Total 50 ml 480 ml 240 ml


 


Output Total 10 ml  


 


Balance 40 ml 480 ml 240 ml











Exam


Review of Systems:


CONSTITUTIONAL:  No fevers, chills.


PULMONARY:  No sob


CARDIOVASCULAR: No chest pain/palpitations


GASTROINTESTINAL:  No nausea/vomiting.


GENITOURINARY:  No hematuria/dysuria.


MUSCULOSKELETAL:  No myagias/arthalgias.


PSYCHIATRIC:  The patient denies depression.


NEUROLOGIC:   No weakness


Constitutional:  alert, oriented


Psych:  no complaints


Head:  normocephalic


ENMT:  mucosa pink and moist


Neck:  jvd (9 cm water), supple


Respiratory:  clear to auscultation


Cardiovascular:  regular rate and rhythm


Gastrointestinal:  non-tender, soft


Musculoskeletal:  muscle tone (normal)


Extremities:  edema (none), other (L arm covered by dressing antecubital fossa)





Results


Result Diagram:  


9/29/17 0446                                                                   

             9/30/17 0445





Results 24 hrs





Laboratory Tests








Test


  9/30/17


15:03 9/30/17


17:06 9/30/17


17:51 9/30/17


21:38


 


Bedside Glucose 173    139   189  


 


Creatine Kinase  21  L  


 


Creatine Kinase Index      


 


Creatinine Kinase MB (Mass)  < 0.22    


 


Troponin I  < 0.012    














Test


  10/1/17


01:59 10/1/17


08:24 10/1/17


12:54 


 


 


Bedside Glucose 136   177   225  H 











Medications


Medications





 Current Medications


Atorvastatin Calcium (Lipitor) 80 mg HS PO  Last administered on 9/30/17at 21:29

; Admin Dose 80 MG;  Start 9/29/17 at 21:00


Carvedilol (Coreg) 6.25 mg BID PO  Last administered on 10/1/17at 08:28; Admin 

Dose 6.25 MG;  Start 9/28/17 at 23:30


Furosemide (Lasix) 40 mg DAILY PO  Last administered on 9/30/17at 15:38; Admin 

Dose 40 MG;  Start 9/29/17 at 09:00


Losartan Potassium (Cozaar) 100 mg DAILY PO  Last administered on 10/1/17at 08:

28; Admin Dose 100 MG;  Start 9/29/17 at 09:00


Aspirin (Aspirin) 81 mg DAILY PO  Last administered on 10/1/17at 08:27; Admin 

Dose 81 MG;  Start 9/29/17 at 09:00


Fish Oil (Fish Oil) 1,000 mg DAILY PO  Last administered on 10/1/17at 08:29; 

Admin Dose 1,000 MG;  Start 9/29/17 at 09:00


Pantoprazole (Protonix Tab) 40 mg DAILY@06 PO  Last administered on 10/1/17at 05

:21; Admin Dose 40 MG;  Start 9/29/17 at 06:00


Paroxetine HCl (Paxil) 10 mg DAILY PO  Last administered on 10/1/17at 08:29; 

Admin Dose 10 MG;  Start 9/29/17 at 09:00


Ondansetron HCl (Zofran Inj) 4 mg Q6H  PRN IV NAUSEA AND/OR VOMITING Last 

administered on 10/1/17at 04:38; Admin Dose 4 MG;  Start 9/28/17 at 23:30


Morphine Sulfate (morphine) 1 mg Q4H  PRN IV PAIN LEVEL 7-10 Last administered 

on 10/1/17at 01:05; Admin Dose 1 MG;  Start 9/28/17 at 23:30


Acetaminophen (Tylenol Tab) 650 mg Q4H  PRN PO PAIN AND OR ELEVATED TEMP Last 

administered on 9/30/17at 21:34; Admin Dose 650 MG;  Start 9/28/17 at 23:30


Ciprofloxacin (Cipro) 500 mg DAILY@06 PO  Last administered on 10/1/17at 05:21; 

Admin Dose 500 MG;  Start 9/29/17 at 06:00;  Stop 10/2/17 at 05:59


Nifedipine (Procardia Xl) 90 mg BID PO  Last administered on 10/1/17at 08:30; 

Admin Dose 90 MG;  Start 9/29/17 at 00:00


Miscellaneous Information 1 ea NOTE XX ;  Start 9/29/17 at 00:30


Glucose (Glutose) 15 gm Q15M  PRN PO DECREASED GLUCOSE;  Start 9/29/17 at 00:30


Glucose (Glutose) 22.5 gm Q15M  PRN PO DECREASED GLUCOSE;  Start 9/29/17 at 00:

30


Dextrose (D50w Syringe) 25 ml Q15M  PRN IV DECREASED GLUCOSE;  Start 9/29/17 at 

00:30


Dextrose (D50w Syringe) 50 ml Q15M  PRN IV DECREASED GLUCOSE;  Start 9/29/17 at 

00:30


Glucagon (Glucagen) 1 mg Q15M  PRN IM DECREASED GLUCOSE;  Start 9/29/17 at 00:30


Glucose (Glutose) 15 gm Q15M  PRN BUCCAL DECREASED GLUCOSE;  Start 9/29/17 at 00

:30


Isosorbide Mononitrate (Imdur) 30 mg QHS PO  Last administered on 9/30/17at 21:

35; Admin Dose 30 MG;  Start 9/29/17 at 21:00


Clonidine (Catapres) 0.1 mg Q6H  PRN PO SBP>170;  Start 9/29/17 at 20:30


Diagnostic Test (Pha) (Accu-Chek) 1 ea 02 XX ;  Start 10/1/17 at 02:00











HARJINDER ROMANO Oct 1, 2017 13:10

## 2017-10-01 NOTE — CONS
Date/Time of Note


Date/Time of Note


DATE: 10/1/17 


TIME: 13:50





Assessment/Plan


Assessment/Plan


Additional Assessment/Plan


1. Malfunctioning HD catheter, clotted LUE AVF-  missed HD.ESRD on HD, Regular 

schedule is MWF 


2. SP Ligation of left radiocephalic fistula.  Creation of a left arm 

brachiocephalic arteriovenous fistula.


2. ESRD on HD


3. HTN


4. HL


5. Recurrent HD catheter issues





Plan :


-continue current care


-BP stable


- to FU with vascular sx  as recommended


- patient discharged  in a hemodynamically stable condition





Dov Thomason





Consultation Date/Type/Reason


Admit Date/Time


Sep 28, 2017 at 20:39


Initial Consult Date


9/29/17


Type of Consultation:  cardiology


Referring Provider:  EDSON NAILS





24 HR Interval Summary


Free Text/Dictation


Patient just left - got discharged in a stable condition.dw staff. No new 

events reported by staff.





Exam/Review of Systems


Vital Signs


Vitals





 Vital Signs








  Date Time  Temp Pulse Resp B/P Pulse Ox O2 Delivery O2 Flow Rate FiO2


 


10/1/17 08:34 98.1 76 20 149/69 97   


 


9/30/17 16:45      Nasal Cannula 2.0 














 Intake and Output   


 


 9/30/17 9/30/17 10/1/17





 15:00 23:00 07:00


 


Intake Total 50 ml 480 ml 240 ml


 


Output Total 10 ml  


 


Balance 40 ml 480 ml 240 ml











Results


Result Diagram:  


9/29/17 0446                                                                   

             9/30/17 0445





Results 24 hrs





Laboratory Tests








Test


  9/30/17


15:03 9/30/17


17:06 9/30/17


17:51 9/30/17


21:38


 


Bedside Glucose 173    139   189  


 


Creatine Kinase  21  L  


 


Creatine Kinase Index      


 


Creatinine Kinase MB (Mass)  < 0.22    


 


Troponin I  < 0.012    














Test


  10/1/17


01:59 10/1/17


08:24 10/1/17


12:54 


 


 


Bedside Glucose 136   177   225  H 











Medications


Medications





 Current Medications


Atorvastatin Calcium (Lipitor) 80 mg HS PO  Last administered on 9/30/17at 21:29

; Admin Dose 80 MG;  Start 9/29/17 at 21:00


Carvedilol (Coreg) 6.25 mg BID PO  Last administered on 10/1/17at 08:28; Admin 

Dose 6.25 MG;  Start 9/28/17 at 23:30


Furosemide (Lasix) 40 mg DAILY PO  Last administered on 9/30/17at 15:38; Admin 

Dose 40 MG;  Start 9/29/17 at 09:00


Losartan Potassium (Cozaar) 100 mg DAILY PO  Last administered on 10/1/17at 08:

28; Admin Dose 100 MG;  Start 9/29/17 at 09:00


Aspirin (Aspirin) 81 mg DAILY PO  Last administered on 10/1/17at 08:27; Admin 

Dose 81 MG;  Start 9/29/17 at 09:00


Fish Oil (Fish Oil) 1,000 mg DAILY PO  Last administered on 10/1/17at 08:29; 

Admin Dose 1,000 MG;  Start 9/29/17 at 09:00


Pantoprazole (Protonix Tab) 40 mg DAILY@06 PO  Last administered on 10/1/17at 05

:21; Admin Dose 40 MG;  Start 9/29/17 at 06:00


Paroxetine HCl (Paxil) 10 mg DAILY PO  Last administered on 10/1/17at 08:29; 

Admin Dose 10 MG;  Start 9/29/17 at 09:00


Ondansetron HCl (Zofran Inj) 4 mg Q6H  PRN IV NAUSEA AND/OR VOMITING Last 

administered on 10/1/17at 04:38; Admin Dose 4 MG;  Start 9/28/17 at 23:30


Morphine Sulfate (morphine) 1 mg Q4H  PRN IV PAIN LEVEL 7-10 Last administered 

on 10/1/17at 01:05; Admin Dose 1 MG;  Start 9/28/17 at 23:30


Acetaminophen (Tylenol Tab) 650 mg Q4H  PRN PO PAIN AND OR ELEVATED TEMP Last 

administered on 9/30/17at 21:34; Admin Dose 650 MG;  Start 9/28/17 at 23:30


Ciprofloxacin (Cipro) 500 mg DAILY@06 PO  Last administered on 10/1/17at 05:21; 

Admin Dose 500 MG;  Start 9/29/17 at 06:00;  Stop 10/2/17 at 05:59


Nifedipine (Procardia Xl) 90 mg BID PO  Last administered on 10/1/17at 08:30; 

Admin Dose 90 MG;  Start 9/29/17 at 00:00


Miscellaneous Information 1 ea NOTE XX ;  Start 9/29/17 at 00:30


Glucose (Glutose) 15 gm Q15M  PRN PO DECREASED GLUCOSE;  Start 9/29/17 at 00:30


Glucose (Glutose) 22.5 gm Q15M  PRN PO DECREASED GLUCOSE;  Start 9/29/17 at 00:

30


Dextrose (D50w Syringe) 25 ml Q15M  PRN IV DECREASED GLUCOSE;  Start 9/29/17 at 

00:30


Dextrose (D50w Syringe) 50 ml Q15M  PRN IV DECREASED GLUCOSE;  Start 9/29/17 at 

00:30


Glucagon (Glucagen) 1 mg Q15M  PRN IM DECREASED GLUCOSE;  Start 9/29/17 at 00:30


Glucose (Glutose) 15 gm Q15M  PRN BUCCAL DECREASED GLUCOSE;  Start 9/29/17 at 00

:30


Isosorbide Mononitrate (Imdur) 30 mg QHS PO  Last administered on 9/30/17at 21:

35; Admin Dose 30 MG;  Start 9/29/17 at 21:00


Clonidine (Catapres) 0.1 mg Q6H  PRN PO SBP>170;  Start 9/29/17 at 20:30


Diagnostic Test (Pha) (Accu-Chek) 1 ea 02 XX ;  Start 10/1/17 at 02:00











TIMMY BURNETT Oct 1, 2017 13:52








TIMMY BURNETT Oct 1, 2017 13:52

## 2017-10-02 NOTE — RADRPT
Vent Rate: 68 bpm

RR Interval: 0 msec

CO Interval: 184 msec

QRS Duration: 90 msec

QT Interval: 448 msec

QTC Interval: 476 msec

P-R-T Axis: 61 - -50 - 73 degrees

 

 Normal sinus rhythm

 Left anterior fascicular block

 Abnormal ECG

 

Electronically Signed By: Kalyan Hager

[id number]

## 2017-10-02 NOTE — RADRPT
Vent Rate: 76 bpm

RR Interval: 0 msec

NM Interval: 162 msec

QRS Duration: 90 msec

QT Interval: 432 msec

QTC Interval: 486 msec

P-R-T Axis: 58 - -43 - 82 degrees

 

 Normal sinus rhythm

 Left axis deviation

 ST  amp; T wave abnormality, consider anterolateral ischemia

 Prolonged QT

 Abnormal ECG

 

Electronically Signed By: Kalyan Hager

[id number]

## 2018-07-02 ENCOUNTER — HOSPITAL ENCOUNTER (OUTPATIENT)
Dept: HOSPITAL 91 - CCL | Age: 78
Discharge: HOME | End: 2018-07-02
Payer: COMMERCIAL

## 2018-07-02 ENCOUNTER — HOSPITAL ENCOUNTER (OUTPATIENT)
Age: 78
Discharge: HOME | End: 2018-07-02

## 2018-07-02 DIAGNOSIS — I25.10: ICD-10-CM

## 2018-07-02 DIAGNOSIS — N18.6: ICD-10-CM

## 2018-07-02 DIAGNOSIS — I12.0: ICD-10-CM

## 2018-07-02 DIAGNOSIS — E78.5: ICD-10-CM

## 2018-07-02 DIAGNOSIS — E11.9: ICD-10-CM

## 2018-07-02 DIAGNOSIS — T82.898A: Primary | ICD-10-CM
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
## 2018-07-02 DIAGNOSIS — I48.91: ICD-10-CM

## 2018-07-02 LAB
ADD MAN DIFF?: NO
ANION GAP: 23 (ref 8–16)
BASOPHIL #: 0 10^3/UL (ref 0–0.1)
BASOPHILS %: 0.7 % (ref 0–2)
BLOOD UREA NITROGEN: 70 MG/DL (ref 7–20)
CALCIUM: 9.1 MG/DL (ref 8.4–10.2)
CARBON DIOXIDE: 22 MMOL/L (ref 21–31)
CHLORIDE: 100 MMOL/L (ref 97–110)
CREATININE: 6.04 MG/DL (ref 0.44–1)
EOSINOPHILS #: 0.1 10^3/UL (ref 0–0.5)
EOSINOPHILS %: 2.2 % (ref 0–7)
GLUCOSE: 119 MG/DL (ref 70–220)
HEMATOCRIT: 35.6 % (ref 37–47)
HEMOGLOBIN: 11.3 G/DL (ref 12–16)
LYMPHOCYTES #: 1.5 10^3/UL (ref 0.8–2.9)
LYMPHOCYTES %: 27.3 % (ref 15–51)
MEAN CORPUSCULAR HEMOGLOBIN: 28.2 PG (ref 29–33)
MEAN CORPUSCULAR HGB CONC: 31.7 G/DL (ref 32–37)
MEAN CORPUSCULAR VOLUME: 88.8 FL (ref 82–101)
MEAN PLATELET VOLUME: 9.9 FL (ref 7.4–10.4)
MONOCYTE #: 0.4 10^3/UL (ref 0.3–0.9)
MONOCYTES %: 7.8 % (ref 0–11)
NEUTROPHIL #: 3.3 10^3/UL (ref 1.6–7.5)
NEUTROPHILS %: 61.6 % (ref 39–77)
NUCLEATED RED BLOOD CELLS #: 0 10^3/UL (ref 0–0)
NUCLEATED RED BLOOD CELLS%: 0 /100WBC (ref 0–0)
PLATELET COUNT: 236 10^3/UL (ref 140–415)
POTASSIUM: 5.9 MMOL/L (ref 3.5–5.1)
RED BLOOD COUNT: 4.01 10^6/UL (ref 4.2–5.4)
RED CELL DISTRIBUTION WIDTH: 14.2 % (ref 11.5–14.5)
SODIUM: 139 MMOL/L (ref 135–144)
WHITE BLOOD COUNT: 5.4 10^3/UL (ref 4.8–10.8)

## 2018-07-02 PROCEDURE — 85025 COMPLETE CBC W/AUTO DIFF WBC: CPT

## 2018-07-02 PROCEDURE — 93005 ELECTROCARDIOGRAM TRACING: CPT

## 2018-07-02 PROCEDURE — 80048 BASIC METABOLIC PNL TOTAL CA: CPT

## 2018-07-02 PROCEDURE — 36901 INTRO CATH DIALYSIS CIRCUIT: CPT

## 2018-07-02 PROCEDURE — 36909 DIALYSIS CIRCUIT EMBOLJ: CPT

## 2018-07-02 PROCEDURE — 71045 X-RAY EXAM CHEST 1 VIEW: CPT

## 2018-07-02 PROCEDURE — 82962 GLUCOSE BLOOD TEST: CPT

## 2018-10-15 ENCOUNTER — HOSPITAL ENCOUNTER (OUTPATIENT)
Age: 78
Discharge: HOME | End: 2018-10-15

## 2019-01-15 ENCOUNTER — HOSPITAL ENCOUNTER (OUTPATIENT)
Dept: HOSPITAL 91 - SDS | Age: 79
Discharge: HOME | End: 2019-01-15
Payer: COMMERCIAL

## 2019-01-15 ENCOUNTER — HOSPITAL ENCOUNTER (OUTPATIENT)
Dept: HOSPITAL 10 - SDS | Age: 79
Discharge: HOME | End: 2019-01-15
Attending: SURGERY
Payer: COMMERCIAL

## 2019-01-15 DIAGNOSIS — I12.0: Primary | ICD-10-CM

## 2019-01-15 DIAGNOSIS — Z53.8: ICD-10-CM

## 2019-01-15 DIAGNOSIS — N18.6: ICD-10-CM

## 2019-02-09 ENCOUNTER — HOSPITAL ENCOUNTER (INPATIENT)
Dept: HOSPITAL 10 - E/R | Age: 79
LOS: 4 days | Discharge: HOME | DRG: 682 | End: 2019-02-13
Attending: INTERNAL MEDICINE
Payer: COMMERCIAL

## 2019-02-09 ENCOUNTER — HOSPITAL ENCOUNTER (INPATIENT)
Dept: HOSPITAL 91 - E/R | Age: 79
LOS: 4 days | Discharge: HOME | DRG: 682 | End: 2019-02-13
Payer: COMMERCIAL

## 2019-02-09 VITALS
BODY MASS INDEX: 24.71 KG/M2 | WEIGHT: 134.26 LBS | WEIGHT: 134.26 LBS | HEIGHT: 62 IN | HEIGHT: 62 IN | BODY MASS INDEX: 24.71 KG/M2

## 2019-02-09 DIAGNOSIS — I48.0: ICD-10-CM

## 2019-02-09 DIAGNOSIS — D63.1: ICD-10-CM

## 2019-02-09 DIAGNOSIS — E11.9: ICD-10-CM

## 2019-02-09 DIAGNOSIS — E87.70: ICD-10-CM

## 2019-02-09 DIAGNOSIS — Z99.2: ICD-10-CM

## 2019-02-09 DIAGNOSIS — I16.9: ICD-10-CM

## 2019-02-09 DIAGNOSIS — I12.0: Primary | ICD-10-CM

## 2019-02-09 DIAGNOSIS — E78.5: ICD-10-CM

## 2019-02-09 DIAGNOSIS — N18.6: ICD-10-CM

## 2019-02-09 DIAGNOSIS — Z90.710: ICD-10-CM

## 2019-02-09 LAB
ADD MAN DIFF?: NO
ALANINE AMINOTRANSFERASE: 18 IU/L (ref 13–69)
ALBUMIN/GLOBULIN RATIO: 1.38
ALBUMIN: 4.3 G/DL (ref 3.3–4.9)
ALKALINE PHOSPHATASE: 97 IU/L (ref 42–121)
ANION GAP: 10 (ref 5–13)
ASPARTATE AMINO TRANSFERASE: 20 IU/L (ref 15–46)
BASOPHIL #: 0 10^3/UL (ref 0–0.1)
BASOPHILS %: 0.7 % (ref 0–2)
BILIRUBIN,DIRECT: 0 MG/DL (ref 0–0.2)
BILIRUBIN,TOTAL: 0 MG/DL (ref 0.2–1.3)
BLOOD UREA NITROGEN: 66 MG/DL (ref 7–20)
CALCIUM: 9.6 MG/DL (ref 8.4–10.2)
CARBON DIOXIDE: 29 MMOL/L (ref 21–31)
CHLORIDE: 100 MMOL/L (ref 97–110)
CREATININE: 4.32 MG/DL (ref 0.44–1)
EOSINOPHILS #: 0 10^3/UL (ref 0–0.5)
EOSINOPHILS %: 0.7 % (ref 0–7)
GLOBULIN: 3.1 G/DL (ref 1.3–3.2)
GLUCOSE: 142 MG/DL (ref 70–220)
HEMATOCRIT: 24.5 % (ref 37–47)
HEMOGLOBIN: 7.5 G/DL (ref 12–16)
IMMATURE GRANS #M: 0.03 10^3/UL (ref 0–0.03)
IMMATURE GRANS % (M): 0.5 % (ref 0–0.43)
INR: 1.16
LIPASE: 47 U/L (ref 23–300)
LYMPHOCYTES #: 1.8 10^3/UL (ref 0.8–2.9)
LYMPHOCYTES %: 31.1 % (ref 15–51)
MEAN CORPUSCULAR HEMOGLOBIN: 29.3 PG (ref 29–33)
MEAN CORPUSCULAR HGB CONC: 30.6 G/DL (ref 32–37)
MEAN CORPUSCULAR VOLUME: 95.7 FL (ref 82–101)
MEAN PLATELET VOLUME: 9.6 FL (ref 7.4–10.4)
MONOCYTE #: 0.5 10^3/UL (ref 0.3–0.9)
MONOCYTES %: 8.1 % (ref 0–11)
NEUTROPHIL #: 3.4 10^3/UL (ref 1.6–7.5)
NEUTROPHILS %: 58.9 % (ref 39–77)
NUCLEATED RED BLOOD CELLS #: 0 10^3/UL (ref 0–0)
NUCLEATED RED BLOOD CELLS%: 0 /100WBC (ref 0–0)
PARTIAL THROMBOPLASTIN TIME: 31.7 SEC (ref 23–35)
PLATELET COUNT: 282 10^3/UL (ref 140–415)
POTASSIUM: 4.5 MMOL/L (ref 3.5–5.1)
PROTIME: 14.9 SEC (ref 11.9–14.9)
PT RATIO: 1.2
RED BLOOD COUNT: 2.56 10^6/UL (ref 4.2–5.4)
RED CELL DISTRIBUTION WIDTH: 14.6 % (ref 11.5–14.5)
SODIUM: 139 MMOL/L (ref 135–144)
TOTAL PROTEIN: 7.4 G/DL (ref 6.1–8.1)
TROPONIN-I: 0.02 NG/ML (ref 0–0.12)
WHITE BLOOD COUNT: 5.7 10^3/UL (ref 4.8–10.8)

## 2019-02-09 PROCEDURE — 85025 COMPLETE CBC W/AUTO DIFF WBC: CPT

## 2019-02-09 PROCEDURE — 83036 HEMOGLOBIN GLYCOSYLATED A1C: CPT

## 2019-02-09 PROCEDURE — 83735 ASSAY OF MAGNESIUM: CPT

## 2019-02-09 PROCEDURE — 85610 PROTHROMBIN TIME: CPT

## 2019-02-09 PROCEDURE — 87081 CULTURE SCREEN ONLY: CPT

## 2019-02-09 PROCEDURE — 85730 THROMBOPLASTIN TIME PARTIAL: CPT

## 2019-02-09 PROCEDURE — P9016 RBC LEUKOCYTES REDUCED: HCPCS

## 2019-02-09 PROCEDURE — 80053 COMPREHEN METABOLIC PANEL: CPT

## 2019-02-09 PROCEDURE — 87340 HEPATITIS B SURFACE AG IA: CPT

## 2019-02-09 PROCEDURE — 83540 ASSAY OF IRON: CPT

## 2019-02-09 PROCEDURE — 82270 OCCULT BLOOD FECES: CPT

## 2019-02-09 PROCEDURE — 86644 CMV ANTIBODY: CPT

## 2019-02-09 PROCEDURE — 36430 TRANSFUSION BLD/BLD COMPNT: CPT

## 2019-02-09 PROCEDURE — 80048 BASIC METABOLIC PNL TOTAL CA: CPT

## 2019-02-09 PROCEDURE — 93005 ELECTROCARDIOGRAM TRACING: CPT

## 2019-02-09 PROCEDURE — 83690 ASSAY OF LIPASE: CPT

## 2019-02-09 PROCEDURE — 86901 BLOOD TYPING SEROLOGIC RH(D): CPT

## 2019-02-09 PROCEDURE — 82728 ASSAY OF FERRITIN: CPT

## 2019-02-09 PROCEDURE — 84443 ASSAY THYROID STIM HORMONE: CPT

## 2019-02-09 PROCEDURE — 82306 VITAMIN D 25 HYDROXY: CPT

## 2019-02-09 PROCEDURE — 86850 RBC ANTIBODY SCREEN: CPT

## 2019-02-09 PROCEDURE — 90935 HEMODIALYSIS ONE EVALUATION: CPT

## 2019-02-09 PROCEDURE — 86704 HEP B CORE ANTIBODY TOTAL: CPT

## 2019-02-09 PROCEDURE — 84484 ASSAY OF TROPONIN QUANT: CPT

## 2019-02-09 PROCEDURE — P9047 ALBUMIN (HUMAN), 25%, 50ML: HCPCS

## 2019-02-09 PROCEDURE — 36415 COLL VENOUS BLD VENIPUNCTURE: CPT

## 2019-02-09 PROCEDURE — 80069 RENAL FUNCTION PANEL: CPT

## 2019-02-09 PROCEDURE — 97161 PT EVAL LOW COMPLEX 20 MIN: CPT

## 2019-02-09 PROCEDURE — 86920 COMPATIBILITY TEST SPIN: CPT

## 2019-02-09 PROCEDURE — 97166 OT EVAL MOD COMPLEX 45 MIN: CPT

## 2019-02-09 PROCEDURE — 84100 ASSAY OF PHOSPHORUS: CPT

## 2019-02-09 PROCEDURE — 80061 LIPID PANEL: CPT

## 2019-02-09 PROCEDURE — 99285 EMERGENCY DEPT VISIT HI MDM: CPT

## 2019-02-09 PROCEDURE — 86900 BLOOD TYPING SEROLOGIC ABO: CPT

## 2019-02-09 NOTE — ERD
ER Documentation


Chief Complaint


Chief Complaint





MD referral d/t low hemoglobin - 7.4. htn @ home, last dialysis Fri.





HPI


78-year-old woman complains of recent dizziness and weakness, she was referred 


here by dialysis center for low hemoglobin.  She has had anemia in the past.  


She denies blood per rectum or melena, no abdominal pain, no chest pain or 


shortness of breath.





ROS


All systems reviewed and are negative except as per history of present illness.





Medications


Home Meds


Reported Medications


Sevelamer Hcl* (Renagel*) 800 Mg Tablet, 800 MG PO WITH MEALS, TAB


   10/15/18


Paroxetine Hcl* (Paxil*) 10 Mg Tablet, 10 MG PO DAILY, TAB


   10/15/18


Omeprazole* (Omeprazole*) 20 Mg Capsule.dr, 20 MG PO DAILY, #30 CAP


   10/15/18


Omega-3/Dha/Epa/Fish Oil (FISH  MG SOFTGEL) 1 Each Capsule, 1 EACH PO 


BID, CAP


   10/15/18


Nifedipine* (Nifedipine ER*) 90 Mg Tablet.er, 90 MG PO DAILY, TAB


   10/15/18


Multivitamin* (Daily Value*) 1 Each Tablet, 1 TAB PO DAILY, TAB


   10/15/18


Losartan Potassium* (Losartan Potassium*) 100 Mg Tablet, 100 MG PO DAILY, TAB


   10/15/18


Hydralazine Hcl* (Hydralazine Hcl*) 50 Mg Tab, 50 MG PO TID, #90 TAB


   10/15/18


Furosemide* (Furosemide*) 40 Mg Tablet, 40 MG PO DAILY, TAB


   10/15/18


Carvedilol* (Carvedilol*) 6.25 Mg Tablet, 6.25 MG PO BID, #60 TAB


   10/15/18


Atorvastatin* (Atorvastatin*) 80 Mg Tablet, 80 MG PO QHS, #30 TAB


   10/15/18


Aspirin (Low Dose Aspirin) 81 Mg Tablet.dr, 81 MG PO DAILY, #30 TAB


   10/15/18





Allergies


Allergies:  


Coded Allergies:  


     No Known Allergy (Unverified , 1/15/19)





PMhx/Soc


End-stage kidney disease hemodialysis dependent, hypertension, atrial 


fibrillation, GERD, anemia of kidney disease


History of Surgery:  Yes (fistula, cath placement, hyterectomy)


Anesthesia Reaction:  No


Hx Neurological Disorder:  No


Hx Respiratory Disorders:  No


Hx Cardiac Disorders:  Yes (htn, hld)


Hx Psychiatric Problems:  No


Hx Miscellaneous Medical Probl:  No


Hx Alcohol Use:  No


Hx Substance Use:  No


Hx Tobacco Use:  No





Physical Exam


Vitals





Vital Signs


  Date      Temp  Pulse  Resp  B/P (MAP)   Pulse Ox  O2          O2 Flow    FiO2


Time                                                 Delivery    Rate


   2/10/19           57    18      165/46        96  Room Air


     00:34                           (85)


    2/9/19  98.4     64    16      216/89        97


     21:07                          (131)





Physical Exam


Const:   No acute distress, afebrile, appears dehydrated


HEENT:            Dry mucous membranes, pale conjunctive a


Resp:   Clear to auscultation bilaterally


Cardio:   Regular rate and rhythm, no murmurs


Abd:    Soft, non tender, non distended. Normal bowel sounds


Skin:   No petechiae or rashes


Back:   No midline or flank tenderness


Ext:    No cyanosis, or edema


Neur:   Awake and alert x3, no focal deficits or facial asymmetry, pupils equal 


round reactive to light


Psych:    Normal Mood and Affect


Result Diagram:  


2/9/19 2314                                                                     


          2/9/19 2314





Results 24 hrs





Laboratory Tests


              Test
                                  2/9/19
23:14


              White Blood Count                      5.7 10^3/ul


              Red Blood Count                       2.56 10^6/ul


              Hemoglobin                                7.5 g/dl


              Hematocrit                                  24.5 %


              Mean Corpuscular Volume                    95.7 fl


              Mean Corpuscular Hemoglobin                29.3 pg


              Mean Corpuscular Hemoglobin
Concent     30.6 g/dl 



              Red Cell Distribution Width                 14.6 %


              Platelet Count                         282 10^3/UL


              Mean Platelet Volume                        9.6 fl


              Immature Granulocytes %                    0.500 %


              Neutrophils %                               58.9 %


              Lymphocytes %                               31.1 %


              Monocytes %                                  8.1 %


              Eosinophils %                                0.7 %


              Basophils %                                  0.7 %


              Nucleated Red Blood Cells %            0.0 /100WBC


              Immature Granulocytes #              0.030 10^3/ul


              Neutrophils #                          3.4 10^3/ul


              Lymphocytes #                          1.8 10^3/ul


              Monocytes #                            0.5 10^3/ul


              Eosinophils #                          0.0 10^3/ul


              Basophils #                            0.0 10^3/ul


              Nucleated Red Blood Cells #            0.0 10^3/ul


              Prothrombin Time                          14.9 Sec


              Prothrombin Time Ratio                         1.2


              INR International Normalized
Ratio           1.16 



              Activated Partial
Thromboplast Time      31.7 Sec 



              Sodium Level                            139 mmol/L


              Potassium Level                         4.5 mmol/L


              Chloride Level                          100 mmol/L


              Carbon Dioxide Level                     29 mmol/L


              Anion Gap                                       10


              Blood Urea Nitrogen                       66 mg/dl


              Creatinine                              4.32 mg/dl


              Est Glomerular Filtrat Rate
mL/min    mL/min 



              Glucose Level                            142 mg/dl


              Calcium Level                            9.6 mg/dl


              Total Bilirubin                          0.0 mg/dl


              Direct Bilirubin                        0.00 mg/dl


              Indirect Bilirubin                       0.0 mg/dl


              Aspartate Amino Transf
(AST/SGOT)         20 IU/L 



              Alanine Aminotransferase
(ALT/SGPT)       18 IU/L 



              Alkaline Phosphatase                       97 IU/L


              Troponin I                             0.016 ng/ml


              Total Protein                             7.4 g/dl


              Albumin                                   4.3 g/dl


              Globulin                                 3.10 g/dl


              Albumin/Globulin Ratio                        1.38


              Lipase                                      47 U/L





Current Medications


 Medications
   Dose
          Sig/Baltazar
       Start Time
   Status  Last


 (Trade)       Ordered        Route
 PRN     Stop Time              Admin
Dose


                              Reason                                Admin


 Sodium         500 ml @ 
     Q1H ONCE
      2/10/19       DC       



Chloride       500 mls/hr     IV
            00:00



                                             2/10/19 00:59


 IV Flush
      3 ml           PER            2/10/19                



(NS 3 ml)                     PROTOCOL
 IV
  00:30



 Ondansetron    4 mg           Q6H  PRN
      2/10/19                



HCl
  (Zofran                 PO
            00:30



Tab)                          NAUSEA/VOMITI


                              NG


                650 mg         Q6H  PRN
      2/10/19                



Acetaminophen                 PO
 .PAIN 1-3  00:30




  (Tylenol                   OR TEMP


Tab)


 Docusate       100 mg         Q12H  PRN
     2/10/19                



Sodium
                       PO
            00:30



(Colace)                      .CONSTIPATION


 Bisacodyl
     5 mg           DAILY  PRN
    2/10/19                



(Dulcolax)                    PO
            00:30



                              .CONSTIPATION


 Hydralazine    20 mg          ONCE  ONCE
    2/10/19       DC       



HCl
                          IV
            00:30



(Apresoline)                                 2/10/19 00:33


 Paroxetine     10 mg          DAILY
 PO
     2/10/19                



HCl
  (Paxil)                                09:00



 Sevelamer      800 mg         WITH  MEALS
   2/10/19                



HCl
                          PO
            08:00



(Renagel)


                20 mg          DAILY
 PO
     2/10/19       DC       



Miscellaneous                                09:00




 Information                                2/10/19 09:00


 Hydralazine    10 mg          Q4H  PRN
      2/10/19                



HCl
                          IV
 ELEVATED   01:00



(Apresoline)                  BLOOD


                              PRESSURE


                40 mg          DAILY@06
      2/10/19                



Pantoprazole
                 PO
            06:00



 (Protonix


Tab)








Procedures/MDM


IV line was established patient was placed on cardiac monitor rhythm strip 


revealed a sinus rhythm at about 60 bpm with upright P and T waves.  Patient was


afebrile





EKG performed, read by me revealed a sinus bradycardia 59 bpm, left axis 


deviation, narrow QRS complex, no concerning ST elevations or depressions noted





CBC reveals anemia with a hemoglobin of 7.5, electrolytes reveal acute kidney 


injury, liver function tests normal, troponin negative





I ordered transfusion 2 units PRBC IV over 4 hours for symptomatic anemia.  


Patient will be admitted to [facility] for continued medical management and 


hemodialysis.





Departure


Diagnosis:  


   Primary Impression:  


   Symptomatic anemia


   Additional Impression:  


   End stage kidney disease


Condition:  KRISTINE Girard MD              Feb 9, 2019 23:14

## 2019-02-10 VITALS — SYSTOLIC BLOOD PRESSURE: 104 MMHG | HEART RATE: 61 BPM | RESPIRATION RATE: 16 BRPM | DIASTOLIC BLOOD PRESSURE: 51 MMHG

## 2019-02-10 VITALS — SYSTOLIC BLOOD PRESSURE: 133 MMHG | RESPIRATION RATE: 18 BRPM | DIASTOLIC BLOOD PRESSURE: 61 MMHG | HEART RATE: 73 BPM

## 2019-02-10 VITALS — RESPIRATION RATE: 18 BRPM | SYSTOLIC BLOOD PRESSURE: 124 MMHG | HEART RATE: 64 BPM | DIASTOLIC BLOOD PRESSURE: 58 MMHG

## 2019-02-10 VITALS — HEART RATE: 62 BPM | RESPIRATION RATE: 18 BRPM | SYSTOLIC BLOOD PRESSURE: 163 MMHG | DIASTOLIC BLOOD PRESSURE: 60 MMHG

## 2019-02-10 VITALS — DIASTOLIC BLOOD PRESSURE: 60 MMHG | SYSTOLIC BLOOD PRESSURE: 137 MMHG

## 2019-02-10 LAB
% IRON SATURATION: 19 % SAT (ref 22–52)
ADD MAN DIFF?: NO
ANION GAP: 14 (ref 5–13)
BASOPHIL #: 0 10^3/UL (ref 0–0.1)
BASOPHILS %: 0.4 % (ref 0–2)
BLOOD UREA NITROGEN: 70 MG/DL (ref 7–20)
CALCIUM: 9.5 MG/DL (ref 8.4–10.2)
CARBON DIOXIDE: 26 MMOL/L (ref 21–31)
CHLORIDE: 100 MMOL/L (ref 97–110)
CREATININE: 4.75 MG/DL (ref 0.44–1)
EOSINOPHILS #: 0.1 10^3/UL (ref 0–0.5)
EOSINOPHILS %: 1.4 % (ref 0–7)
FERRITIN: 603 NG/ML (ref 11.1–264)
GLUCOSE: 118 MG/DL (ref 70–220)
HEMATOCRIT: 24.6 % (ref 37–47)
HEMOGLOBIN: 7.8 G/DL (ref 12–16)
IMMATURE GRANS #M: 0.02 10^3/UL (ref 0–0.03)
IMMATURE GRANS % (M): 0.4 % (ref 0–0.43)
IMMEDIATE SPIN CROSSMATCH: 1 4
IRON: 41 UG/DL (ref 35–150)
LYMPHOCYTES #: 1.7 10^3/UL (ref 0.8–2.9)
LYMPHOCYTES %: 33.9 % (ref 15–51)
MEAN CORPUSCULAR HEMOGLOBIN: 30.1 PG (ref 29–33)
MEAN CORPUSCULAR HGB CONC: 31.7 G/DL (ref 32–37)
MEAN CORPUSCULAR VOLUME: 95 FL (ref 82–101)
MEAN PLATELET VOLUME: 9 FL (ref 7.4–10.4)
MONOCYTE #: 0.4 10^3/UL (ref 0.3–0.9)
MONOCYTES %: 8.7 % (ref 0–11)
NEUTROPHIL #: 2.7 10^3/UL (ref 1.6–7.5)
NEUTROPHILS %: 55.2 % (ref 39–77)
NUCLEATED RED BLOOD CELLS #: 0 10^3/UL (ref 0–0)
NUCLEATED RED BLOOD CELLS%: 0 /100WBC (ref 0–0)
PLATELET COUNT: 229 10^3/UL (ref 140–415)
POTASSIUM: 4.3 MMOL/L (ref 3.5–5.1)
RED BLOOD COUNT: 2.59 10^6/UL (ref 4.2–5.4)
RED CELL DISTRIBUTION WIDTH: 14.2 % (ref 11.5–14.5)
SODIUM: 140 MMOL/L (ref 135–144)
TOTAL IRON BINDING CAPACITY: 213 UG/DL (ref 241–421)
WHITE BLOOD COUNT: 4.9 10^3/UL (ref 4.8–10.8)

## 2019-02-10 PROCEDURE — 30233N1 TRANSFUSION OF NONAUTOLOGOUS RED BLOOD CELLS INTO PERIPHERAL VEIN, PERCUTANEOUS APPROACH: ICD-10-PCS

## 2019-02-10 RX ADMIN — SEVELAMER CARBONATE 1 MG: 800 TABLET, FILM COATED ORAL at 12:06

## 2019-02-10 RX ADMIN — SEVELAMER CARBONATE SCH MG: 800 TABLET, FILM COATED ORAL at 08:24

## 2019-02-10 RX ADMIN — SEVELAMER CARBONATE 1 MG: 800 TABLET, FILM COATED ORAL at 17:03

## 2019-02-10 RX ADMIN — LOSARTAN POTASSIUM SCH MG: 50 TABLET, FILM COATED ORAL at 08:24

## 2019-02-10 RX ADMIN — SEVELAMER CARBONATE SCH MG: 800 TABLET, FILM COATED ORAL at 17:03

## 2019-02-10 RX ADMIN — LOSARTAN POTASSIUM 1 MG: 50 TABLET ORAL at 08:24

## 2019-02-10 RX ADMIN — PANTOPRAZOLE SODIUM 1 MG: 40 TABLET, DELAYED RELEASE ORAL at 05:26

## 2019-02-10 RX ADMIN — ATORVASTATIN CALCIUM SCH MG: 80 TABLET, FILM COATED ORAL at 20:47

## 2019-02-10 RX ADMIN — SEVELAMER CARBONATE SCH MG: 800 TABLET, FILM COATED ORAL at 12:06

## 2019-02-10 RX ADMIN — PAROXETINE 1 MG: 10 TABLET, FILM COATED ORAL at 08:24

## 2019-02-10 RX ADMIN — SEVELAMER CARBONATE 1 MG: 800 TABLET, FILM COATED ORAL at 08:24

## 2019-02-10 RX ADMIN — PAROXETINE SCH MG: 10 TABLET, FILM COATED ORAL at 08:24

## 2019-02-10 RX ADMIN — ATORVASTATIN CALCIUM 1 MG: 80 TABLET, FILM COATED ORAL at 20:47

## 2019-02-10 RX ADMIN — NIFEDIPINE 1 MG: 90 TABLET, FILM COATED, EXTENDED RELEASE ORAL at 08:26

## 2019-02-10 RX ADMIN — HYDRALAZINE HYDROCHLORIDE 1 MG: 20 INJECTION INTRAMUSCULAR; INTRAVENOUS at 04:33

## 2019-02-10 RX ADMIN — PANTOPRAZOLE SODIUM SCH MG: 40 TABLET, DELAYED RELEASE ORAL at 05:26

## 2019-02-10 RX ADMIN — DOCUSATE SODIUM 1 MG: 100 CAPSULE, LIQUID FILLED ORAL at 20:47

## 2019-02-10 RX ADMIN — LIDOCAINE HYDROCHLORIDE 1 MLS/HR: 10 INJECTION, SOLUTION EPIDURAL; INFILTRATION; INTRACAUDAL; PERINEURAL at 04:32

## 2019-02-10 RX ADMIN — NIFEDIPINE SCH MG: 90 TABLET, FILM COATED, EXTENDED RELEASE ORAL at 08:26

## 2019-02-10 NOTE — NUR
NURSING NOTE:

2ND UBNIT PRBCS STARTED. VSS UPON START.  CHECKED WITH MB CHARGE RN.  WILL CONTINUE TO 
MONITOR.

## 2019-02-10 NOTE — NUR
Clarified with Dr Rapp if we are to transfuse 1 unit of PRBC. Per  only transfuse 1 
unit of PRBC. Will continue to monitor pt

## 2019-02-10 NOTE — PN
Date/Time of Note


Date/Time of Note


DATE: 2/10/19 


TIME: 09:05





Assessment/Plan


VTE Prophylaxis


SCD applied (from Nsg):  Yes


Pharmacological prophylaxis:  NA/contraindicated


Pharm contraindication:  other (? bleeding)





Lines/Catheters


IV Catheter Type (from Nrsg):  Saline Lock


Urinary Cath still in place:  No





Assessment/Plan


Assessment/Plan


1.  Symptomatic anemia


- Patient last hgb 10/2018 was 11.6. No signs of GI bleeding but will monitor 


- FOBT ordered


- s/p 1 unit PRBC and will hold off on further transfusion for now.  If needed, 


will transfuse with HD tomorrow


- iron studies noted





2.  ESRD on HD


- MWF scheduled


- Dr. Thomason consulted for HD management





3.  Afib


- continue BB


- on aspirin but held for now


- currently in sinus





4.  DM


- A1c ordered


- ISS and accuchecks





5.  Disposition


-  continue workup of anemia given baseline may be 11


Result Diagram:  


2/10/19 0553                                                                    


           2/10/19 0553





Results 24hrs





Laboratory Tests


Test
              2/9/19
23:14  2/10/19
05:50  2/10/19
05:53      2/10/19
05:55


White Blood Count         5.7                           4.9


Red Blood Count        2.56  #L                       2.59  L


Hemoglobin              7.5  #L                        7.8  L


Hematocrit             24.5  #L                       24.6  L


Mean Corpuscular         95.7                          95.0


Volume


Mean Corpuscular         29.3                          30.1


Hemoglobin


Mean Corpuscular       30.6  L
  
                   31.7  L
  



Hemoglobin
Concen


t


Red Cell                14.6  H                        14.2


Distribution


Width


Platelet Count           282  #                         229


Mean Platelet             9.6                           9.0


Volume


Immature               0.500  H                       0.400


Granulocytes %


Neutrophils %            58.9                          55.2


Lymphocytes %            31.1                          33.9


Monocytes %               8.1                           8.7


Eosinophils %             0.7                           1.4


Basophils %               0.7                           0.4


Nucleated Red             0.0                           0.0


Blood Cells %


Immature                0.030                         0.020


Granulocytes #


Neutrophils #             3.4                           2.7


Lymphocytes #             1.8                           1.7


Monocytes #               0.5                           0.4


Eosinophils #             0.0                           0.1


Basophils #               0.0                           0.0


Nucleated Red             0.0                           0.0


Blood Cells #


Prothrombin Time         14.9


Prothrombin Time          1.2


Ratio


INR International       1.16  
  
              
              



Normalized
Ratio


Activated               31.7  
  
              
              



Partial
Thrombopl


ast Time


Sodium Level              139                           140


Potassium Level           4.5                           4.3


Chloride Level            100                           100


Carbon Dioxide             29                            26


Level


Anion Gap                  10                           14  H


Blood Urea                66  H                         70  H


Nitrogen


Creatinine              4.32  H                       4.75  H


Est Glomerular       
           
                
            



Filtrat


Rate
mL/min


Glucose Level             142                           118


Calcium Level             9.6                           9.5


Total Bilirubin          0.0  L


Direct Bilirubin         0.00


Indirect                  0.0


Bilirubin


Aspartate Amino           20  
  
              
              



Transf
(AST/SGOT)


Alanine                   18  
  
              
              



Aminotransferase



(ALT/SGPT)


Alkaline                   97


Phosphatase


Troponin I              0.016


Total Protein             7.4


Albumin                   4.3


Globulin                 3.10


Albumin/Globulin         1.38


Ratio


Lipase                     47


Iron Level                                41


Total Iron                              213  L


Binding Capacity


Percent Iron                             19  L


Saturation


Ferritin                              603.0  H


Lab Scanned        
             
              
              BLOOD
TRANSFUSION


Report









Subjective


24 Hr Interval Summary


Free Text/Dictation


Patient states she doesn't feel any different following unit of PRBC.  She isn't


verbalizing her complaints well and remains very vague.  Denies any chest pain, 


shortness of breath, abdominal or urinary issues.





Exam/Review of Systems


Exam


Vitals





Vital Signs


  Date      Temp  Pulse  Resp  B/P (MAP)   Pulse Ox  O2          O2 Flow    FiO2


Time                                                 Delivery    Rate


   2/10/19  98.4     62    18      163/60        97


     07:41                           (94)


   2/10/19                                           Room Air


     00:34








Intake and Output





2/9/19


2/9/19


2/10/19





1515:00


23:00


07:00





IntakeIntake Total


350 ml





BalanceBalance


350 ml











Exam


General: No acute distress. fatigued


Neck: Supple 


Chest: Nontender, right chest permacath


Lungs: Clear to auscultation bilaterally no crackles rales or wheezing


Heart: Currently normal sinus rhythm


Abdomen: Soft , nontender,  nondistended , bowel sounds are present


Extremities: Normal to inspection, no edema no cyanosis





Results


Results 24hrs





Laboratory Tests


Test
              2/9/19
23:14  2/10/19
05:50  2/10/19
05:53      2/10/19
05:55


White Blood Count         5.7                           4.9


Red Blood Count        2.56  #L                       2.59  L


Hemoglobin              7.5  #L                        7.8  L


Hematocrit             24.5  #L                       24.6  L


Mean Corpuscular         95.7                          95.0


Volume


Mean Corpuscular         29.3                          30.1


Hemoglobin


Mean Corpuscular       30.6  L
  
                   31.7  L
  



Hemoglobin
Concen


t


Red Cell                14.6  H                        14.2


Distribution


Width


Platelet Count           282  #                         229


Mean Platelet             9.6                           9.0


Volume


Immature               0.500  H                       0.400


Granulocytes %


Neutrophils %            58.9                          55.2


Lymphocytes %            31.1                          33.9


Monocytes %               8.1                           8.7


Eosinophils %             0.7                           1.4


Basophils %               0.7                           0.4


Nucleated Red             0.0                           0.0


Blood Cells %


Immature                0.030                         0.020


Granulocytes #


Neutrophils #             3.4                           2.7


Lymphocytes #             1.8                           1.7


Monocytes #               0.5                           0.4


Eosinophils #             0.0                           0.1


Basophils #               0.0                           0.0


Nucleated Red             0.0                           0.0


Blood Cells #


Prothrombin Time         14.9


Prothrombin Time          1.2


Ratio


INR International       1.16  
  
              
              



Normalized
Ratio


Activated               31.7  
  
              
              



Partial
Thrombopl


ast Time


Sodium Level              139                           140


Potassium Level           4.5                           4.3


Chloride Level            100                           100


Carbon Dioxide             29                            26


Level


Anion Gap                  10                           14  H


Blood Urea                66  H                         70  H


Nitrogen


Creatinine              4.32  H                       4.75  H


Est Glomerular       
           
                
            



Filtrat


Rate
mL/min


Glucose Level             142                           118


Calcium Level             9.6                           9.5


Total Bilirubin          0.0  L


Direct Bilirubin         0.00


Indirect                  0.0


Bilirubin


Aspartate Amino           20  
  
              
              



Transf
(AST/SGOT)


Alanine                   18  
  
              
              



Aminotransferase



(ALT/SGPT)


Alkaline                   97


Phosphatase


Troponin I              0.016


Total Protein             7.4


Albumin                   4.3


Globulin                 3.10


Albumin/Globulin         1.38


Ratio


Lipase                     47


Iron Level                                41


Total Iron                              213  L


Binding Capacity


Percent Iron                             19  L


Saturation


Ferritin                              603.0  H


Lab Scanned        
             
              
              BLOOD
TRANSFUSION


Report









Medications


Medication





Current Medications


IV Flush (NS 3 ml) 3 ml PER PROTOCOL IV ;  Start 2/10/19 at 00:30


Ondansetron HCl (Zofran Tab) 4 mg Q6H  PRN PO NAUSEA/VOMITING;  Start 2/10/19 at


00:30


Acetaminophen (Tylenol Tab) 650 mg Q6H  PRN PO .PAIN 1-3 OR TEMP;  Start 2/10/19


at 00:30


Docusate Sodium (Colace) 100 mg Q12H  PRN PO .CONSTIPATION;  Start 2/10/19 at 


00:30


Bisacodyl (Dulcolax) 5 mg DAILY  PRN PO .CONSTIPATION;  Start 2/10/19 at 00:30


Paroxetine HCl (Paxil) 10 mg DAILY PO  Last administered on 2/10/19at 08:24; 


Admin Dose 10 MG;  Start 2/10/19 at 09:00


Sevelamer Carbonate (Renvela) 800 mg WITH  MEALS PO  Last administered on 


2/10/19at 08:24; Admin Dose 800 MG;  Start 2/10/19 at 07:35


Hydralazine HCl (Apresoline) 10 mg Q4H  PRN IV ELEVATED BLOOD PRESSURE;  Start 


2/10/19 at 01:00


Pantoprazole (Protonix Tab) 40 mg DAILY@06 PO  Last administered on 2/10/19at 


05:26; Admin Dose 40 MG;  Start 2/10/19 at 06:00


Atorvastatin Calcium (Lipitor) 80 mg QHS PO ;  Start 2/10/19 at 21:00


Carvedilol (Coreg) 6.25 mg BID PO  Last administered on 2/10/19at 08:25; Admin 


Dose 6.25 MG;  Start 2/10/19 at 09:00


Losartan Potassium (Cozaar) 100 mg DAILY PO  Last administered on 2/10/19at 


08:24; Admin Dose 100 MG;  Start 2/10/19 at 09:00


Nifedipine (Procardia Xl) 90 mg DAILY PO  Last administered on 2/10/19at 08:26; 


Admin Dose 90 MG;  Start 2/10/19 at 09:00


Heparin Sodium (Porcine) (Heparin (1000 Units/ml)) 4,000 unit AFTER  DIALYSIS 


CATHETER ;  Start 2/10/19 at 09:00


Albumin Human 100 ml @  100 mls/hr WITH DIALYSIS  PRN IV SBP <90 DURING DIALY


SIS;  Start 2/10/19 at 09:00











LUIS A TRACY MD                 Feb 10, 2019 09:05

## 2019-02-10 NOTE — HP
Date/Time of Note


Date/Time of Note


DATE: 2/10/19 


TIME: 00:43





Assessment/Plan


VTE Prophylaxis


SCD applied (from Nsg):  Yes


Pharmacological prophylaxis:  NA/contraindicated


Pharm contraindication:  low risk/ambulating





Lines/Catheters


IV Catheter Type (from Nrsg):  Saline Lock





Assessment/Plan


Hospital Course


#1  Symptomatic anemia: Secondary likely to anemia of chronic disease secondary 


to ESRD, versus other.  Will check stool occult blood.  Will check iron stores. 


Will transfuse 1 unit of PRBCs at the current time, recheck CBC in the a.m. and 


give an additional unit of blood if hemoglobin is less than 8.  May need 


additional units as her baseline appears to be around 11 based on her lab 


trends.





#2  End-stage renal disease: Dialysis MWF, last session was on friday. on avoid 


nephrotoxins, epogen as per nephro. consult nephro dr. Thomason





#3 Paroxysmal atrial fibrillation: Currently normal sinus. Only on ASA, which 


well be held right now secondary to #1. Previously on coumadin





#4 HTN crisis: prn hydralazine,  Continue patient's home medications monitor 


blood pressure as tolerated. 





#5 diabetes mellitus: We will check hemoglobin A1c, insulin sliding scale.





#6 DVT GI prophylaxis: SCDs, acid blocker.  





Further treatment strategy will be implemented as per the clinical course


Result Diagram:  


2/9/19 2314 2/9/19 2314





Results 24hrs





Laboratory Tests


               Test
                                2/9/19
23:14


               White Blood Count                           5.7


               Red Blood Count                          2.56  #L


               Hemoglobin                                7.5  #L


               Hematocrit                               24.5  #L


               Mean Corpuscular Volume                    95.7


               Mean Corpuscular Hemoglobin                29.3


               Mean Corpuscular Hemoglobin
Concent      30.6  L



               Red Cell Distribution Width               14.6  H


               Platelet Count                             282  #


               Mean Platelet Volume                        9.6


               Immature Granulocytes %                  0.500  H


               Neutrophils %                              58.9


               Lymphocytes %                              31.1


               Monocytes %                                 8.1


               Eosinophils %                               0.7


               Basophils %                                 0.7


               Nucleated Red Blood Cells %                 0.0


               Immature Granulocytes #                   0.030


               Neutrophils #                               3.4


               Lymphocytes #                               1.8


               Monocytes #                                 0.5


               Eosinophils #                               0.0


               Basophils #                                 0.0


               Nucleated Red Blood Cells #                 0.0


               Prothrombin Time                           14.9


               Prothrombin Time Ratio                      1.2


               INR International Normalized
Ratio        1.16  



               Activated Partial
Thromboplast Time       31.7  



               Sodium Level                                139


               Potassium Level                             4.5


               Chloride Level                              100


               Carbon Dioxide Level                         29


               Anion Gap                                    10


               Blood Urea Nitrogen                         66  H


               Creatinine                                4.32  H


               Est Glomerular Filtrat Rate
mL/min     



               Glucose Level                               142


               Calcium Level                               9.6


               Total Bilirubin                            0.0  L


               Direct Bilirubin                           0.00


               Indirect Bilirubin                          0.0


               Aspartate Amino Transf
(AST/SGOT)           20  



               Alanine Aminotransferase
(ALT/SGPT)         18  



               Alkaline Phosphatase                         97


               Troponin I                                0.016


               Total Protein                               7.4


               Albumin                                     4.3


               Globulin                                   3.10


               Albumin/Globulin Ratio                     1.38


               Lipase                                       47








HPI/ROS


Admit Date/Time


Admit Date/Time








Hx of Present Illness


Chief complaint: Sent in by MD for low hemoglobin of 7.4. .








This is a 78-year-old woman with a past medical history of paroxysmal A. fib, 


end-stage renal disease on HD Monday Wednesday Friday who complains of recent 


dizziness and weakness, she was referred here by dialysis center for low 


hemoglobin.  She has had anemia in the past.  She denies blood per rectum or 


melena, no abdominal pain, no chest pain or shortness of breath.





Of note on the EMR patient was previously on Coumadin however at the current 


time she only appears to be on aspirin 81 mg.





Allergies: NKDA





Medications: See MAR





ROS


Const: As per HPI


Eyes : No pain discharge or redness or change in visual acuity


ENT: No pain, sore throat, congestion, congestion,  dysphagia or discharge


Respiratory: No shortness of breath, cough, sputum, wheezing, or pleuritic pain


Cardiovascular: No chest pain, palpitation, PND, or edema


GI : no change in appetite, abdominal pain, nausea, vomiting, diarrhea, 


constipation, or change in the color his stool 


Genitourinary: No dysuria, hematuria, flank pain ,  discharge or CVA tenderness


Musculoskeletal: No joint pain, back pain, neck pain, restricted range of motion


in neck or joints


Skin: No rash, bruising or hives 


Neuro: No headache, dizziness, syncope, seizure, focal weakness


Endocrine: No polyuria, polydipsia, temperature intolerance


Psych: No hallucination, depression, anxiety or suicidal ideation





PMH/Family/Social


Past Medical History


Paroxysmal atrial fibrillation, hypertension, diabetes mellitus, end-stage renal


disease


Medications





Current Medications


Sodium Chloride 500 ml @  500 mls/hr Q1H ONCE IV ;  Start 2/10/19 at 00:00;  


Stop 2/10/19 at 00:59


IV Flush (NS 3 ml) 3 ml PER PROTOCOL IV ;  Start 2/10/19 at 00:30


Ondansetron HCl (Zofran Tab) 4 mg Q6H  PRN PO NAUSEA/VOMITING;  Start 2/10/19 at


00:30


Acetaminophen (Tylenol Tab) 650 mg Q6H  PRN PO .PAIN 1-3 OR TEMP;  Start 2/10/19


at 00:30


Docusate Sodium (Colace) 100 mg Q12H  PRN PO .CONSTIPATION;  Start 2/10/19 at 


00:30


Bisacodyl (Dulcolax) 5 mg DAILY  PRN PO .CONSTIPATION;  Start 2/10/19 at 00:30


Paroxetine HCl (Paxil) 10 mg DAILY PO ;  Start 2/10/19 at 09:00


Sevelamer HCl (Renagel) 800 mg WITH  MEALS PO ;  Start 2/10/19 at 08:00


Hydralazine HCl (Apresoline) 10 mg Q4H  PRN IV ELEVATED BLOOD PRESSURE;  Start 


2/10/19 at 01:00


Pantoprazole (Protonix Tab) 40 mg DAILY@06 PO ;  Start 2/10/19 at 06:00


Coded Allergies:  


     No Known Allergy (Unverified , 1/15/19)





Past Surgical History


Left AV fistula surgery x2 however it is no longer patent,  Hysterectomy, right 


chest permacath


Past Surgical Hx:  other





Family History


Significant Family History:  no pertinent family hx





Social History


Alcohol Use:  none


Smoking Status:  Never smoker


Drug Use:  none





Exam/Review of Systems


Vital Signs


Vitals





Vital Signs


  Date      Temp  Pulse  Resp  B/P (MAP)   Pulse Ox  O2          O2 Flow    FiO2


Time                                                 Delivery    Rate


   2/10/19           57    18      165/46        96  Room Air


     00:34                           (85)


    2/9/19  98.4


     21:07








Exam


Exam





General: Patient is a pleasant female currently lying in bed in no acute 


distress


HEENT: Atraumatic, normocephalic. The pupils are equal, round and reactive. 


Extraocular motor are intact


Neck: Supple with full range of motion. No rigidity or meningismus


Chest: Nontender, right chest permacath


Lungs: Clear to auscultation bilaterally no crackles rales or wheezing


Heart: Currently normal sinus rhythm


Abdomen: Soft , nontender,  nondistended , bowel sounds are present. No guarding


no rebound tenderness , No masses or organomegaly. No costovertebral temporal 


angle mass


Extremities: Normal to inspection, no edema no cyanosis


Vascular: Right chest permacath, left AV fistula with the upper extremities x2


Neurologic: Normal mental status, speech normal, cranial nerves II through XII 


are intact, motor and sensory are intact, no focal weakness


Additional Comments


EKG a sinus bradycardia 59 bpm, left axis deviation, narrow QRS complex, no 


concerning ST elevations or depressions noted











FELIZ TUTTLE                 Feb 10, 2019 00:43

## 2019-02-10 NOTE — NUR
VS stable. No acute changes or s/s of respiratory distress during shift. 1  unit of PRBC was 
transfused and pt tolerated transfusion well; 1 unit is ready at blood bank if needed. 
Hourly rounding provided. Fall precautions observed with call light within reach. Pending 
HgbA1c. Will endorse continuity of care to oncoming nurse. 

-------------------------------------------------------------------------------

Addendum: 02/10/19 at 0649 by GABE DUNN RN

-------------------------------------------------------------------------------

1 unit of PRBC to transfuse today; PRBC is ready at blood bank. Unable to obtain stool 
sample; Will endorse to oncoming nurse.

## 2019-02-10 NOTE — NUR
END OF SHIFT:

PATIENT RESTING IN BED, PATIENT DENIED PAIN DURING SHIFT, NO PAIN MEDICATIONS GIVEN.  PRBCS 
ORDER CANCELLED.  DIALYSIS ORDERED FOR TOMORROW, PATIENT AWARE. BLOOD PRESSURE CONTROLLED 
WITH ORAL MEDICATIONS. PATIENT AMBULATORY IN ROOM, STEADY ON FEET, PHYSICAL THERAPY AND 
OCCUPATIONAL THERAPY ORDERED FOR TOMORROW.  HOURLY ROUNDING DONE DURING SHIFT, CALL LIGHT 
WITHIN REACH, PT ABLE TO VERBALIZE NEEDS, FALL PRECAUTIONS OBSERVED, WILL ENDORSE TO NIGHT 
RN.

## 2019-02-10 NOTE — NUR
Pt admitted onto unit for anemia. Pt currently running 1 unit of PRBC and confirmed with Dr Rapp to transfuse only 1 unit of PRBC. Oriented pt to room and surroundings. Inventory of 
pt's belongings done. Pt refused photos taken of her heels and sacrum at this time; skin is 
intact and charge nurse is aware. Encouraged pt to reposition self frequently. Will continue 
to monitor

-------------------------------------------------------------------------------

Addendum: 02/10/19 at 0426 by GABE DUNN RN

-------------------------------------------------------------------------------

Pt finished blood transfusion and VS are stable. Will continue to monitor.

## 2019-02-10 NOTE — CONS
Assessment/Plan


Assessment/Plan


Assessment/Plan (Daily)


1. acute fluid overload


2. Severe symptomatic anemia


3. ESRD on HD MWF 


4. h/o HTN 


5. H/o HL 


6. h/o paryoxysmal atrial fibillation 





Plan:


seen in med/surge floor


plan for PRBC transfusion, ok to transfuse


will plan for HD tomorrow morning , pt regular schedule is MWF


Epogen 6000 units SQ MWF 


will follow up 


Continue other HOme meds


Thanks for consultation





Consultation Date/Type/Reason


Admit Date/Time


2/10/19


Date of Consultation:  Feb 10, 2019


Type of Consult


NEPHROLOGY


Reason for Consultation


ESRD on HD with anemia


Requesting Provider:  FELIZ TUTTLE


Date/Time of Note


DATE: 2/10/19 


TIME: 08:50





Hx of Present Illness


78-year-old woman with a past medical history of paroxysmal A. fib, end-stage 


renal disease on HD Monday Wednesday Friday who complains of recent dizziness 


and weakness, she was referred here by dialysis center for low hemoglobin.  She 


has had anemia in the past.  She denies blood per rectum or melena, no abdominal


pain, no chest pain or shortness of breath.


pt is ordered to have PRBC transfusion and She gets HD on MWF through [facility]


Renal has been consulted for maintainance HD.


Constitutional:  poor po


Eyes:  no complaints


ENT:  no complaints


Respiratory:  shortness of breath


Cardiovascular:  no complaints


Gastrointestinal:  no complaints


Genitourinary:  no complaints


Musculoskeletal:  no complaints


Skin:  no complaints


Neurologic:  no complaints


Endocrine:  no complaints


Lymphatic:  no complaints


Psychological:  no complaints


Immunologic:  no complaints





Past Medical History


Medical History:  high cholesterol, hypertension, other (ESRD on HD )


Home Meds


Reported Medications


Sevelamer Hcl* (Renagel*) 800 Mg Tablet, 800 MG PO WITH MEALS, TAB


   10/15/18


Paroxetine Hcl* (Paxil*) 10 Mg Tablet, 10 MG PO DAILY, TAB


   10/15/18


Omeprazole* (Omeprazole*) 20 Mg Capsule.dr, 20 MG PO DAILY, #30 CAP


   10/15/18


Omega-3/Dha/Epa/Fish Oil (FISH  MG SOFTGEL) 1 Each Capsule, 1 EACH PO 


BID, CAP


   10/15/18


Nifedipine* (Nifedipine ER*) 90 Mg Tablet.er, 90 MG PO DAILY, TAB


   10/15/18


Multivitamin* (Daily Value*) 1 Each Tablet, 1 TAB PO DAILY, TAB


   10/15/18


Losartan Potassium* (Losartan Potassium*) 100 Mg Tablet, 100 MG PO DAILY, TAB


   10/15/18


Hydralazine Hcl* (Hydralazine Hcl*) 50 Mg Tab, 50 MG PO TID, #90 TAB


   10/15/18


Furosemide* (Furosemide*) 40 Mg Tablet, 40 MG PO DAILY, TAB


   10/15/18


Carvedilol* (Carvedilol*) 6.25 Mg Tablet, 6.25 MG PO BID, #60 TAB


   10/15/18


Atorvastatin* (Atorvastatin*) 80 Mg Tablet, 80 MG PO QHS, #30 TAB


   10/15/18


Aspirin (Low Dose Aspirin) 81 Mg Tablet.dr, 81 MG PO DAILY, #30 TAB


   10/15/18


Medications





Current Medications


IV Flush (NS 3 ml) 3 ml PER PROTOCOL IV ;  Start 2/10/19 at 00:30


Ondansetron HCl (Zofran Tab) 4 mg Q6H  PRN PO NAUSEA/VOMITING;  Start 2/10/19 at


00:30


Acetaminophen (Tylenol Tab) 650 mg Q6H  PRN PO .PAIN 1-3 OR TEMP;  Start 2/10/19


at 00:30


Docusate Sodium (Colace) 100 mg Q12H  PRN PO .CONSTIPATION;  Start 2/10/19 at 


00:30


Bisacodyl (Dulcolax) 5 mg DAILY  PRN PO .CONSTIPATION;  Start 2/10/19 at 00:30


Paroxetine HCl (Paxil) 10 mg DAILY PO  Last administered on 2/10/19at 08:24; 


Admin Dose 10 MG;  Start 2/10/19 at 09:00


Sevelamer Carbonate (Renvela) 800 mg WITH  MEALS PO  Last administered on 


2/10/19at 08:24; Admin Dose 800 MG;  Start 2/10/19 at 07:35


Hydralazine HCl (Apresoline) 10 mg Q4H  PRN IV ELEVATED BLOOD PRESSURE;  Start 


2/10/19 at 01:00


Pantoprazole (Protonix Tab) 40 mg DAILY@06 PO  Last administered on 2/10/19at 


05:26; Admin Dose 40 MG;  Start 2/10/19 at 06:00


Atorvastatin Calcium (Lipitor) 80 mg QHS PO ;  Start 2/10/19 at 21:00


Carvedilol (Coreg) 6.25 mg BID PO  Last administered on 2/10/19at 08:25; Admin 


Dose 6.25 MG;  Start 2/10/19 at 09:00


Losartan Potassium (Cozaar) 100 mg DAILY PO  Last administered on 2/10/19at 


08:24; Admin Dose 100 MG;  Start 2/10/19 at 09:00


Nifedipine (Procardia Xl) 90 mg DAILY PO  Last administered on 2/10/19at 08:26; 


Admin Dose 90 MG;  Start 2/10/19 at 09:00


Allergies:  


Coded Allergies:  


     No Known Allergy (Unverified , 1/15/19)





Past Surgical History


Past Surgical Hx:  other (AVF for HD access )





Family History


Significant Family History:  no pertinent family hx





Social History


Alcohol Use:  none


Smoking Status:  Former smoker


Drug Use:  none





Exam/Review of Systems


Exam


Vitals





Vital Signs


  Date      Temp  Pulse  Resp  B/P (MAP)   Pulse Ox  O2          O2 Flow    FiO2


Time                                                 Delivery    Rate


   2/10/19  98.4     62    18      163/60        97


     07:41                           (94)


   2/10/19                                           Room Air


     00:34








Intake and Output





2/9/19


2/9/19


2/10/19





1515:00


23:00


07:00





IntakeIntake Total


350 ml





BalanceBalance


350 ml











Constitutional:  alert


Psych:  no complaints


Head:  normocephalic


Eyes:  nl conjunctiva


ENMT:  nl external ears & nose


Neck:  supple, non-tender


Respiratory:  clear to auscultation, normal air movement, diminished breath 


sounds


Cardiovascular:  regular rate and rhythm


Gastrointestinal:  soft, non-tender


Musculoskeletal:  nl extremities to inspection


Extremities:  normal pulses


Neurological:  CNS II-XII intact, nl mental status


Skin:  nl turgor


Lymph:  No nl lymph nodes





Results


Result Diagram:  


2/10/19 0553                                                                    


           2/10/19 0553





Results 24hrs





Laboratory Tests


Test
              2/9/19
23:14  2/10/19
05:50  2/10/19
05:53      2/10/19
05:55


White Blood Count         5.7                           4.9


Red Blood Count        2.56  #L                       2.59  L


Hemoglobin              7.5  #L                        7.8  L


Hematocrit             24.5  #L                       24.6  L


Mean Corpuscular         95.7                          95.0


Volume


Mean Corpuscular         29.3                          30.1


Hemoglobin


Mean Corpuscular       30.6  L
  
                   31.7  L
  



Hemoglobin
Concen


t


Red Cell                14.6  H                        14.2


Distribution


Width


Platelet Count           282  #                         229


Mean Platelet             9.6                           9.0


Volume


Immature               0.500  H                       0.400


Granulocytes %


Neutrophils %            58.9                          55.2


Lymphocytes %            31.1                          33.9


Monocytes %               8.1                           8.7


Eosinophils %             0.7                           1.4


Basophils %               0.7                           0.4


Nucleated Red             0.0                           0.0


Blood Cells %


Immature                0.030                         0.020


Granulocytes #


Neutrophils #             3.4                           2.7


Lymphocytes #             1.8                           1.7


Monocytes #               0.5                           0.4


Eosinophils #             0.0                           0.1


Basophils #               0.0                           0.0


Nucleated Red             0.0                           0.0


Blood Cells #


Prothrombin Time         14.9


Prothrombin Time          1.2


Ratio


INR International       1.16  
  
              
              



Normalized
Ratio


Activated               31.7  
  
              
              



Partial
Thrombopl


ast Time


Sodium Level              139                           140


Potassium Level           4.5                           4.3


Chloride Level            100                           100


Carbon Dioxide             29                            26


Level


Anion Gap                  10                           14  H


Blood Urea                66  H                         70  H


Nitrogen


Creatinine              4.32  H                       4.75  H


Est Glomerular       
           
                
            



Filtrat


Rate
mL/min


Glucose Level             142                           118


Calcium Level             9.6                           9.5


Total Bilirubin          0.0  L


Direct Bilirubin         0.00


Indirect                  0.0


Bilirubin


Aspartate Amino           20  
  
              
              



Transf
(AST/SGOT)


Alanine                   18  
  
              
              



Aminotransferase



(ALT/SGPT)


Alkaline                   97


Phosphatase


Troponin I              0.016


Total Protein             7.4


Albumin                   4.3


Globulin                 3.10


Albumin/Globulin         1.38


Ratio


Lipase                     47


Iron Level                                41


Total Iron                              213  L


Binding Capacity


Percent Iron                             19  L


Saturation


Ferritin                              603.0  H


Lab Scanned        
             
              
              BLOOD
TRANSFUSION


Report









Medications


Medication





Current Medications


IV Flush (NS 3 ml) 3 ml PER PROTOCOL IV ;  Start 2/10/19 at 00:30


Ondansetron HCl (Zofran Tab) 4 mg Q6H  PRN PO NAUSEA/VOMITING;  Start 2/10/19 at


00:30


Acetaminophen (Tylenol Tab) 650 mg Q6H  PRN PO .PAIN 1-3 OR TEMP;  Start 2/10/19


at 00:30


Docusate Sodium (Colace) 100 mg Q12H  PRN PO .CONSTIPATION;  Start 2/10/19 at 


00:30


Bisacodyl (Dulcolax) 5 mg DAILY  PRN PO .CONSTIPATION;  Start 2/10/19 at 00:30


Paroxetine HCl (Paxil) 10 mg DAILY PO  Last administered on 2/10/19at 08:24; 


Admin Dose 10 MG;  Start 2/10/19 at 09:00


Sevelamer Carbonate (Renvela) 800 mg WITH  MEALS PO  Last administered on 


2/10/19at 08:24; Admin Dose 800 MG;  Start 2/10/19 at 07:35


Hydralazine HCl (Apresoline) 10 mg Q4H  PRN IV ELEVATED BLOOD PRESSURE;  Start 


2/10/19 at 01:00


Pantoprazole (Protonix Tab) 40 mg DAILY@06 PO  Last administered on 2/10/19at 


05:26; Admin Dose 40 MG;  Start 2/10/19 at 06:00


Atorvastatin Calcium (Lipitor) 80 mg QHS PO ;  Start 2/10/19 at 21:00


Carvedilol (Coreg) 6.25 mg BID PO  Last administered on 2/10/19at 08:25; Admin 


Dose 6.25 MG;  Start 2/10/19 at 09:00


Losartan Potassium (Cozaar) 100 mg DAILY PO  Last administered on 2/10/19at 


08:24; Admin Dose 100 MG;  Start 2/10/19 at 09:00


Nifedipine (Procardia Xl) 90 mg DAILY PO  Last administered on 2/10/19at 08:26; 


Admin Dose 90 MG;  Start 2/10/19 at 09:00











RUBY ZUNIGA MD           Feb 10, 2019 08:50

## 2019-02-10 NOTE — NUR
Pt had 500ml bolus and Hydralazine 20 mg IV scheduled at 0030 that was not administered in 
ED. Notified MD and asked if we are to still administer medication. Pt finished 1 unit of 
PRBC transfusion and Current BP is 143/65 and HR 61. Dr Rapp said to wait on both. Will 
continue to monitor.

## 2019-02-10 NOTE — NUR
NURSING NOTE:

PAGED DR TRACY CLARIFYING TOTAL AMOUNT OF UNITS OF BLOOD TO INFUSE, D/T BLOOD BANK ASKING 
FOR CLARIFICATION AS 4 UNITS TOTAL ORDERED BY NIGHT HOSPITALIST.  DR. TRACY PAGED TO STOP 
2ND UNIT AND CANCELL REMAINING UNITS.  INFORMED PATIENT OF PLAN OF CARE, WILL CONTINUE TO 
MONITOR.

## 2019-02-11 VITALS — HEART RATE: 97 BPM | SYSTOLIC BLOOD PRESSURE: 165 MMHG | DIASTOLIC BLOOD PRESSURE: 76 MMHG | RESPIRATION RATE: 18 BRPM

## 2019-02-11 VITALS — HEART RATE: 68 BPM | SYSTOLIC BLOOD PRESSURE: 121 MMHG | RESPIRATION RATE: 17 BRPM | DIASTOLIC BLOOD PRESSURE: 74 MMHG

## 2019-02-11 VITALS — DIASTOLIC BLOOD PRESSURE: 66 MMHG | SYSTOLIC BLOOD PRESSURE: 183 MMHG | HEART RATE: 54 BPM

## 2019-02-11 VITALS — HEART RATE: 53 BPM | DIASTOLIC BLOOD PRESSURE: 68 MMHG | SYSTOLIC BLOOD PRESSURE: 179 MMHG

## 2019-02-11 VITALS — SYSTOLIC BLOOD PRESSURE: 182 MMHG | HEART RATE: 53 BPM | DIASTOLIC BLOOD PRESSURE: 60 MMHG

## 2019-02-11 VITALS — DIASTOLIC BLOOD PRESSURE: 64 MMHG | HEART RATE: 51 BPM | SYSTOLIC BLOOD PRESSURE: 179 MMHG

## 2019-02-11 VITALS — HEART RATE: 55 BPM | SYSTOLIC BLOOD PRESSURE: 174 MMHG | DIASTOLIC BLOOD PRESSURE: 56 MMHG

## 2019-02-11 VITALS — DIASTOLIC BLOOD PRESSURE: 60 MMHG | SYSTOLIC BLOOD PRESSURE: 166 MMHG | HEART RATE: 54 BPM

## 2019-02-11 VITALS — SYSTOLIC BLOOD PRESSURE: 137 MMHG | DIASTOLIC BLOOD PRESSURE: 61 MMHG | HEART RATE: 57 BPM | RESPIRATION RATE: 17 BRPM

## 2019-02-11 VITALS — HEART RATE: 56 BPM | SYSTOLIC BLOOD PRESSURE: 169 MMHG | DIASTOLIC BLOOD PRESSURE: 67 MMHG

## 2019-02-11 VITALS — HEART RATE: 55 BPM | DIASTOLIC BLOOD PRESSURE: 64 MMHG | SYSTOLIC BLOOD PRESSURE: 184 MMHG

## 2019-02-11 VITALS — SYSTOLIC BLOOD PRESSURE: 181 MMHG | DIASTOLIC BLOOD PRESSURE: 67 MMHG | HEART RATE: 51 BPM

## 2019-02-11 VITALS — HEART RATE: 54 BPM | DIASTOLIC BLOOD PRESSURE: 66 MMHG | SYSTOLIC BLOOD PRESSURE: 178 MMHG

## 2019-02-11 VITALS — SYSTOLIC BLOOD PRESSURE: 163 MMHG | DIASTOLIC BLOOD PRESSURE: 72 MMHG | RESPIRATION RATE: 18 BRPM | HEART RATE: 58 BPM

## 2019-02-11 VITALS — RESPIRATION RATE: 17 BRPM | SYSTOLIC BLOOD PRESSURE: 128 MMHG | HEART RATE: 66 BPM | DIASTOLIC BLOOD PRESSURE: 62 MMHG

## 2019-02-11 VITALS — SYSTOLIC BLOOD PRESSURE: 183 MMHG | DIASTOLIC BLOOD PRESSURE: 65 MMHG | HEART RATE: 52 BPM

## 2019-02-11 VITALS — SYSTOLIC BLOOD PRESSURE: 171 MMHG | DIASTOLIC BLOOD PRESSURE: 64 MMHG | HEART RATE: 59 BPM | RESPIRATION RATE: 18 BRPM

## 2019-02-11 VITALS — SYSTOLIC BLOOD PRESSURE: 178 MMHG | DIASTOLIC BLOOD PRESSURE: 72 MMHG | HEART RATE: 63 BPM

## 2019-02-11 LAB
ADD MAN DIFF?: NO
ALANINE AMINOTRANSFERASE: 11 IU/L (ref 13–69)
ALBUMIN/GLOBULIN RATIO: 1.31
ALBUMIN: 3.8 G/DL (ref 3.3–4.9)
ALKALINE PHOSPHATASE: 73 IU/L (ref 42–121)
ANION GAP: 9 (ref 5–13)
ASPARTATE AMINO TRANSFERASE: 16 IU/L (ref 15–46)
BASOPHIL #: 0 10^3/UL (ref 0–0.1)
BASOPHILS %: 0.5 % (ref 0–2)
BILIRUBIN,DIRECT: 0 MG/DL (ref 0–0.2)
BILIRUBIN,TOTAL: 0 MG/DL (ref 0.2–1.3)
BLOOD UREA NITROGEN: 90 MG/DL (ref 7–20)
CALCIUM: 9.5 MG/DL (ref 8.4–10.2)
CARBON DIOXIDE: 26 MMOL/L (ref 21–31)
CHLORIDE: 105 MMOL/L (ref 97–110)
CHOL/HDL RATIO: 3.8 RATIO
CHOLESTEROL: 124 MG/DL (ref 100–200)
CREATININE: 6.16 MG/DL (ref 0.44–1)
EOSINOPHILS #: 0.1 10^3/UL (ref 0–0.5)
EOSINOPHILS %: 1.1 % (ref 0–7)
GLOBULIN: 2.9 G/DL (ref 1.3–3.2)
GLUCOSE: 133 MG/DL (ref 70–220)
HDL CHOLESTEROL: 32 MG/DL (ref 33–92)
HEMATOCRIT: 25.9 % (ref 37–47)
HEMOGLOBIN A1C: 5.7 % (ref 0–5.9)
HEMOGLOBIN: 8.2 G/DL (ref 12–16)
HEPATITIS B CORE ANTIBODY: NEGATIVE
HEPATITIS B SURFACE ANTIGEN: NEGATIVE
IMMATURE GRANS #M: 0.03 10^3/UL (ref 0–0.03)
IMMATURE GRANS % (M): 0.5 % (ref 0–0.43)
LDL CHOLESTEROL,CALCULATED: 61 MG/DL
LYMPHOCYTES #: 1.5 10^3/UL (ref 0.8–2.9)
LYMPHOCYTES %: 23.1 % (ref 15–51)
MAGNESIUM: 2.2 MG/DL (ref 1.7–2.5)
MEAN CORPUSCULAR HEMOGLOBIN: 29.9 PG (ref 29–33)
MEAN CORPUSCULAR HGB CONC: 31.7 G/DL (ref 32–37)
MEAN CORPUSCULAR VOLUME: 94.5 FL (ref 82–101)
MEAN PLATELET VOLUME: 9.6 FL (ref 7.4–10.4)
MONOCYTE #: 0.5 10^3/UL (ref 0.3–0.9)
MONOCYTES %: 7.5 % (ref 0–11)
NEUTROPHIL #: 4.2 10^3/UL (ref 1.6–7.5)
NEUTROPHILS %: 67.3 % (ref 39–77)
NUCLEATED RED BLOOD CELLS #: 0 10^3/UL (ref 0–0)
NUCLEATED RED BLOOD CELLS%: 0 /100WBC (ref 0–0)
PHOSPHORUS: 4.5 MG/DL (ref 2.5–4.9)
PLATELET COUNT: 244 10^3/UL (ref 140–415)
POTASSIUM: 5.2 MMOL/L (ref 3.5–5.1)
RED BLOOD COUNT: 2.74 10^6/UL (ref 4.2–5.4)
RED CELL DISTRIBUTION WIDTH: 14.2 % (ref 11.5–14.5)
SODIUM: 140 MMOL/L (ref 135–144)
THYROID STIMULATING HORMONE: 2.16 MIU/L (ref 0.47–4.68)
TOTAL PROTEIN: 6.7 G/DL (ref 6.1–8.1)
TRIGLYCERIDES: 156 MG/DL (ref 0–149)
WHITE BLOOD COUNT: 6.3 10^3/UL (ref 4.8–10.8)

## 2019-02-11 PROCEDURE — 5A1D70Z PERFORMANCE OF URINARY FILTRATION, INTERMITTENT, LESS THAN 6 HOURS PER DAY: ICD-10-PCS

## 2019-02-11 RX ADMIN — ATORVASTATIN CALCIUM 1 MG: 80 TABLET, FILM COATED ORAL at 20:15

## 2019-02-11 RX ADMIN — HEPARIN SODIUM SCH UNIT: 1000 INJECTION, SOLUTION INTRAVENOUS; SUBCUTANEOUS at 12:47

## 2019-02-11 RX ADMIN — MUPIROCIN SCH APPLIC: 20 OINTMENT TOPICAL at 10:17

## 2019-02-11 RX ADMIN — SEVELAMER CARBONATE 1 MG: 800 TABLET, FILM COATED ORAL at 18:09

## 2019-02-11 RX ADMIN — HEPARIN SODIUM 1 UNIT: 1000 INJECTION, SOLUTION INTRAVENOUS; SUBCUTANEOUS at 12:47

## 2019-02-11 RX ADMIN — ATORVASTATIN CALCIUM SCH MG: 80 TABLET, FILM COATED ORAL at 20:15

## 2019-02-11 RX ADMIN — SEVELAMER CARBONATE SCH MG: 800 TABLET, FILM COATED ORAL at 09:02

## 2019-02-11 RX ADMIN — SEVELAMER CARBONATE SCH MG: 800 TABLET, FILM COATED ORAL at 18:09

## 2019-02-11 RX ADMIN — PANTOPRAZOLE SODIUM SCH MG: 40 TABLET, DELAYED RELEASE ORAL at 05:27

## 2019-02-11 RX ADMIN — EPOETIN ALFA 1 UNITS: 3000 SOLUTION INTRAVENOUS; SUBCUTANEOUS at 18:11

## 2019-02-11 RX ADMIN — HYDRALAZINE HYDROCHLORIDE 1 MG: 20 INJECTION INTRAMUSCULAR; INTRAVENOUS at 12:47

## 2019-02-11 RX ADMIN — LOSARTAN POTASSIUM 1 MG: 50 TABLET ORAL at 09:00

## 2019-02-11 RX ADMIN — LOSARTAN POTASSIUM SCH MG: 50 TABLET, FILM COATED ORAL at 09:00

## 2019-02-11 RX ADMIN — PANTOPRAZOLE SODIUM 1 MG: 40 TABLET, DELAYED RELEASE ORAL at 05:27

## 2019-02-11 RX ADMIN — NIFEDIPINE 1 MG: 90 TABLET, FILM COATED, EXTENDED RELEASE ORAL at 09:00

## 2019-02-11 RX ADMIN — PAROXETINE SCH MG: 10 TABLET, FILM COATED ORAL at 09:02

## 2019-02-11 RX ADMIN — SEVELAMER CARBONATE 1 MG: 800 TABLET, FILM COATED ORAL at 12:46

## 2019-02-11 RX ADMIN — SEVELAMER CARBONATE 1 MG: 800 TABLET, FILM COATED ORAL at 09:02

## 2019-02-11 RX ADMIN — NIFEDIPINE SCH MG: 90 TABLET, FILM COATED, EXTENDED RELEASE ORAL at 09:00

## 2019-02-11 RX ADMIN — MUPIROCIN 1 APPLIC: 20 OINTMENT TOPICAL at 20:19

## 2019-02-11 RX ADMIN — PAROXETINE 1 MG: 10 TABLET, FILM COATED ORAL at 09:02

## 2019-02-11 RX ADMIN — SEVELAMER CARBONATE SCH MG: 800 TABLET, FILM COATED ORAL at 12:46

## 2019-02-11 RX ADMIN — MUPIROCIN SCH APPLIC: 20 OINTMENT TOPICAL at 20:19

## 2019-02-11 RX ADMIN — MUPIROCIN 1 APPLIC: 20 OINTMENT TOPICAL at 10:17

## 2019-02-11 NOTE — NUR
PT EVALUATION NOTE:



Therapy day number 

1

Evaluation Start Time 

08:15

Evaluation End Time 

09:00

Evaluation Total Time 

45 min

Subjective 

Denies pain

Pain Scale

NUMERIC

Pain Intensity

0 (0-10)

Patient Stated Goal for Pain Relief 

0 (0-10)

Pain Level Comment 

denies pain

Pre Treatment Vital Signs Stable 

Yes

Supine to Sit 

Independent

Transfer Sit to Stand Ability

Independent

Bed Mobility Sit to Supine

Independent

Bed Transfer Ability

Independent

Chair Transfer Ability

Independent

Toileting Ability 

Independent

Additional Mobility Comments 

transfers without assistive device

Gait Assist Levels 

Independent

Assistive Devices 

None

Ambulation Distance

200 feet

Additional Gait Comments 

min c/o fatigue after ambulation

Weight Bearing Assessment Label

Bilat Lower Extremity

Weight Bearing Status 

Full Weight Bearing

Static Sitting Balance 

Good

Dynamic Sitting Balance 

Good

Standing Static Balance 

Good

Dynamic Standing Balance 

Good

Safety Judgement 

Good

Activity Tolerance 

Good

Post Treatment Pain Intensity 

0 0-10

PT Technical Record Comment 

Patient is a 78 year old female admitted to [facility] after presenting to the ER 

on 02/09/19 with complaints of recent dizziness and weakness and was 

referred here by dialysis center for low hemoglobin.



PMH: Paroxysmal atrial fibrillation, hypertension, diabetes mellitus, 

end-stage renal disease on HD



PLOF: Patient lives with her sister and brother-in-law in a second floor 

apartment with 20 stairs to enter, bilateral rails. Prior to 

hospitalization patient was independent with all functional mobility 

without an assistive device, was able to negotiate the stairs without 

difficulty and was able to ambulate community distances. 



CLOF: Kay FULTON cleared patient for PT evaluation, patient agreeable to 

participate with PT evaluation. Received patient standing at doorway of 

her room, denied dizziness or pain. Patient educated in safety awareness, 

fall prevention and purpose of PT evaluation. Patient demonstrated 

independent bed mobility and transfers. Patient ambulated 200 feet without 

an assistive device independently, c/o min fatigue after ambulation. 

Patient returned to room, sat in bedside chair, all needs met, awaiting 

dialysis treatment. Kay FULTON notified of patient's status at the end of 

the session. Patient cleared to ambulate with nursing staff supervision. 



PT RECOMMENDATION: Patient demonstrates independence with all functional 

mobility, skilled inpatient PT intervention not indicated. Patient 

discharged from PT, Kay FULTON notified. Patient is cleared to ambulate 

with nursing staff supervision. Anticipate discharge home with family 

assistance as needed once cleared by MD. No DME needs anticipated at this 

time.

## 2019-02-11 NOTE — CONS
Assessment/Plan


Assessment/Plan


Assessment/Plan (Daily)


1. acute fluid overload


2. Severe symptomatic anemia


3. ESRD on HD MWF through chest permacath 


4. h/o HTN 


5. H/o HL 


6. h/o paryoxysmal atrial fibillation 





Plan:


s/p PRBC transfusion yesterday, plan for HD today 


pt regular schedule is MWF


Epogen 6000 units SQ MWF 


will follow up





Consultation Date/Type/Reason


Admit Date/Time


Feb 10, 2019 at 00:18


Initial Consult Date


2/10/19


Type of Consult


NEPHROLOGY


Requesting Provider:  FELIZ TUTTLE


Date/Time of Note


DATE: 2/11/19 


TIME: 11:21





24 HR Interval Summary


Free Text/Dictation


plan for HD through Permcath, Bp stable, afebrile





Exam/Review of Systems


Exam


Vitals





Vital Signs


  Date      Temp  Pulse  Resp  B/P (MAP)   Pulse Ox  O2          O2 Flow    FiO2


Time                                                 Delivery    Rate


   2/11/19  98.6     57    17      137/61        93  Room Air


     08:18                           (86)








Intake and Output





2/10/19


2/10/19


2/11/19





1515:00


23:00


07:00





IntakeIntake Total


1300 ml





BalanceBalance


1300 ml











Exam


Constitutional:  alert, awake 


Respiratory:  clear to auscultation, normal air movement, diminished breath 


sounds


Cardiovascular:  regular rate and rhythm


Gastrointestinal:  soft, non-tender


Musculoskeletal:  nl extremities to inspection


Extremities:  normal pulses


Neurological:  CNS II-XII intact, nl mental status





Results


Result Diagram:  


2/11/19 0526                                                                    


           2/11/19 0526





Results 24hrs





Laboratory Tests


               Test
                                2/11/19
05:26


               White Blood Count                           6.3  #


               Red Blood Count                            2.74  L


               Hemoglobin                                  8.2  L


               Hematocrit                                 25.9  L


               Mean Corpuscular Volume                     94.5


               Mean Corpuscular Hemoglobin                 29.9


               Mean Corpuscular Hemoglobin
Concent       31.7  L



               Red Cell Distribution Width                 14.2


               Platelet Count                               244


               Mean Platelet Volume                         9.6


               Immature Granulocytes %                   0.500  H


               Neutrophils %                               67.3


               Lymphocytes %                               23.1


               Monocytes %                                  7.5


               Eosinophils %                                1.1


               Basophils %                                  0.5


               Nucleated Red Blood Cells %                  0.0


               Immature Granulocytes #                    0.030


               Neutrophils #                                4.2


               Lymphocytes #                                1.5


               Monocytes #                                  0.5


               Eosinophils #                                0.1


               Basophils #                                  0.0


               Nucleated Red Blood Cells #                  0.0


               Sodium Level                                 140


               Potassium Level                             5.2  H


               Chloride Level                               105


               Carbon Dioxide Level                          26


               Anion Gap                                     9  #


               Blood Urea Nitrogen                          90  H


               Creatinine                                 6.16  H


               Est Glomerular Filtrat Rate
mL/min     



               Glucose Level                                133


               Hemoglobin A1c                               5.7


               Calcium Level                                9.5


               Phosphorus Level                             4.5


               Magnesium Level                              2.2


               Total Bilirubin                             0.0  L


               Direct Bilirubin                            0.00


               Indirect Bilirubin                           0.0


               Aspartate Amino Transf
(AST/SGOT)            16  



               Alanine Aminotransferase
(ALT/SGPT)         11  L



               Alkaline Phosphatase                          73


               Total Protein                                6.7


               Albumin                                      3.8


               Globulin                                    2.90


               Albumin/Globulin Ratio                      1.31


               Triglycerides Level                         156  H


               Cholesterol Level                            124


               LDL Cholesterol, Calculated                   61


               HDL Cholesterol                              32  L


               Cholesterol/HDL Ratio                        3.8


               Thyroid Stimulating Hormone
(TSH)         2.160  









Medications


Medication





Current Medications


IV Flush (NS 3 ml) 3 ml PER PROTOCOL IV ;  Start 2/10/19 at 00:30


Ondansetron HCl (Zofran Tab) 4 mg Q6H  PRN PO NAUSEA/VOMITING;  Start 2/10/19 at


00:30


Acetaminophen (Tylenol Tab) 650 mg Q6H  PRN PO .PAIN 1-3 OR TEMP;  Start 2/10/19


at 00:30


Docusate Sodium (Colace) 100 mg Q12H  PRN PO .CONSTIPATION Last administered on 


2/10/19at 20:47; Admin Dose 100 MG;  Start 2/10/19 at 00:30


Bisacodyl (Dulcolax) 5 mg DAILY  PRN PO .CONSTIPATION;  Start 2/10/19 at 00:30


Paroxetine HCl (Paxil) 10 mg DAILY PO  Last administered on 2/11/19at 09:02; 


Admin Dose 10 MG;  Start 2/10/19 at 09:00


Sevelamer Carbonate (Renvela) 800 mg WITH  MEALS PO  Last administered on 


2/11/19at 09:02; Admin Dose 800 MG;  Start 2/10/19 at 07:35


Hydralazine HCl (Apresoline) 10 mg Q4H  PRN IV ELEVATED BLOOD PRESSURE;  Start 


2/10/19 at 01:00


Pantoprazole (Protonix Tab) 40 mg DAILY@06 PO  Last administered on 2/11/19at 


05:27; Admin Dose 40 MG;  Start 2/10/19 at 06:00


Atorvastatin Calcium (Lipitor) 80 mg QHS PO  Last administered on 2/10/19at 


20:47; Admin Dose 80 MG;  Start 2/10/19 at 21:00


Carvedilol (Coreg) 6.25 mg BID PO  Last administered on 2/10/19at 20:47; Admin 


Dose 6.25 MG;  Start 2/10/19 at 09:00


Losartan Potassium (Cozaar) 100 mg DAILY PO  Last administered on 2/10/19at 


08:24; Admin Dose 100 MG;  Start 2/10/19 at 09:00


Nifedipine (Procardia Xl) 90 mg DAILY PO  Last administered on 2/10/19at 08:26; 


Admin Dose 90 MG;  Start 2/10/19 at 09:00


Heparin Sodium (Porcine) (Heparin (1000 Units/ml)) 4,000 unit AFTER  DIALYSIS 


CATHETER ;  Start 2/10/19 at 09:00


Albumin Human 100 ml @  100 mls/hr WITH DIALYSIS  PRN IV SBP <90 DURING 


DIALYSIS;  Start 2/10/19 at 09:00


Epoetin Brian (Epogen (Esrd)) 6,000 units MoWeFr@17 SC ;  Start 2/11/19 at 17:00


Mupirocin (Bactroban) 1 applic BID TOP  Last administered on 2/11/19at 10:17; 


Admin Dose 1 APPLIC;  Start 2/11/19 at 09:30;  Stop 2/18/19 at 09:29











RUBY ZUNIGA MD           Feb 11, 2019 11:21

## 2019-02-11 NOTE — NUR
RN NOTES

PT IS STABLE, VS WITHIN NORMAL LIMITS, PT HAD HD TODAY, 2500 OUTPUT. NO C/O PAIN OR 
DISCOMFORT, CALL LIGHT WITHIN REACH, ENCOURAGED PT TO CALL FOR ASSISTANCE.

## 2019-02-11 NOTE — PN
Date/Time of Note


Date/Time of Note


DATE: 2/11/19 


TIME: 12:30





Assessment/Plan


VTE Prophylaxis


Risk score (from Nsg)>0 risk:  5


SCD applied (from Ns):  Yes


Pharmacological prophylaxis:  NA/contraindicated


Pharm contraindication:  bleeding





Lines/Catheters


IV Catheter Type (from Nrsg):  Saline Lock


Urinary Cath still in place:  No





Assessment/Plan


Assessment/Plan


1.  Symptomatic anemia


- Hgb stable at 8 and Epogen started MWF.  No need for further transfusions at 


this time


- Awaiting FOBT to rule out GI bleed given last hgb 10/2018 was 11.6


- s/p 1 unit PRBC 2/10/19 and will hold off on further transfusion for now


- iron studies noted





2.  ESRD on HD


- MWF scheduled


- Dr. Thomason on board for HD management





3.  Afib


- continue BB


- on aspirin but held for now


- currently in sinus





4.  DM


- A1c ordered


- ISS and accuchecks





5.  Disposition


-  awaiting FOBT to rule out GI bleeding as source of drop in Hgb levels


Result Diagram:  


2/11/19 0526                                                                    


           2/11/19 0526





Results 24hrs





Laboratory Tests


               Test
                                2/11/19
05:26


               White Blood Count                           6.3  #


               Red Blood Count                            2.74  L


               Hemoglobin                                  8.2  L


               Hematocrit                                 25.9  L


               Mean Corpuscular Volume                     94.5


               Mean Corpuscular Hemoglobin                 29.9


               Mean Corpuscular Hemoglobin
Concent       31.7  L



               Red Cell Distribution Width                 14.2


               Platelet Count                               244


               Mean Platelet Volume                         9.6


               Immature Granulocytes %                   0.500  H


               Neutrophils %                               67.3


               Lymphocytes %                               23.1


               Monocytes %                                  7.5


               Eosinophils %                                1.1


               Basophils %                                  0.5


               Nucleated Red Blood Cells %                  0.0


               Immature Granulocytes #                    0.030


               Neutrophils #                                4.2


               Lymphocytes #                                1.5


               Monocytes #                                  0.5


               Eosinophils #                                0.1


               Basophils #                                  0.0


               Nucleated Red Blood Cells #                  0.0


               Sodium Level                                 140


               Potassium Level                             5.2  H


               Chloride Level                               105


               Carbon Dioxide Level                          26


               Anion Gap                                     9  #


               Blood Urea Nitrogen                          90  H


               Creatinine                                 6.16  H


               Est Glomerular Filtrat Rate
mL/min     



               Glucose Level                                133


               Hemoglobin A1c                               5.7


               Calcium Level                                9.5


               Phosphorus Level                             4.5


               Magnesium Level                              2.2


               Total Bilirubin                             0.0  L


               Direct Bilirubin                            0.00


               Indirect Bilirubin                           0.0


               Aspartate Amino Transf
(AST/SGOT)            16  



               Alanine Aminotransferase
(ALT/SGPT)         11  L



               Alkaline Phosphatase                          73


               Total Protein                                6.7


               Albumin                                      3.8


               Globulin                                    2.90


               Albumin/Globulin Ratio                      1.31


               Triglycerides Level                         156  H


               Cholesterol Level                            124


               LDL Cholesterol, Calculated                   61


               HDL Cholesterol                              32  L


               Cholesterol/HDL Ratio                        3.8


               Thyroid Stimulating Hormone
(TSH)         2.160  









Subjective


24 Hr Interval Summary


Free Text/Dictation


Patient states she feeling the same and asking about further blood transfusions.


 Discussed receiving one yesterday and will be started on Epogen.





Exam/Review of Systems


Exam


Vitals





Vital Signs


  Date      Temp  Pulse  Resp  B/P (MAP)   Pulse Ox  O2          O2 Flow    FiO2


Time                                                 Delivery    Rate


   2/11/19  98.6     57    17      137/61        93  Room Air


     08:18                           (86)








Intake and Output





2/10/19


2/10/19


2/11/19





1515:00


23:00


07:00





IntakeIntake Total


1300 ml





BalanceBalance


1300 ml











Exam


General: No acute distress


Neck: Supple 


Chest: Nontender, right chest permacath


Lungs: Clear to auscultation bilaterally no crackles rales or wheezing


Heart: Currently normal sinus rhythm


Abdomen: Soft , nontender,  nondistended , bowel sounds are present


Extremities: Normal to inspection, no edema no cyanosis





Results


Results 24hrs





Laboratory Tests


               Test
                                2/11/19
05:26


               White Blood Count                           6.3  #


               Red Blood Count                            2.74  L


               Hemoglobin                                  8.2  L


               Hematocrit                                 25.9  L


               Mean Corpuscular Volume                     94.5


               Mean Corpuscular Hemoglobin                 29.9


               Mean Corpuscular Hemoglobin
Concent       31.7  L



               Red Cell Distribution Width                 14.2


               Platelet Count                               244


               Mean Platelet Volume                         9.6


               Immature Granulocytes %                   0.500  H


               Neutrophils %                               67.3


               Lymphocytes %                               23.1


               Monocytes %                                  7.5


               Eosinophils %                                1.1


               Basophils %                                  0.5


               Nucleated Red Blood Cells %                  0.0


               Immature Granulocytes #                    0.030


               Neutrophils #                                4.2


               Lymphocytes #                                1.5


               Monocytes #                                  0.5


               Eosinophils #                                0.1


               Basophils #                                  0.0


               Nucleated Red Blood Cells #                  0.0


               Sodium Level                                 140


               Potassium Level                             5.2  H


               Chloride Level                               105


               Carbon Dioxide Level                          26


               Anion Gap                                     9  #


               Blood Urea Nitrogen                          90  H


               Creatinine                                 6.16  H


               Est Glomerular Filtrat Rate
mL/min     



               Glucose Level                                133


               Hemoglobin A1c                               5.7


               Calcium Level                                9.5


               Phosphorus Level                             4.5


               Magnesium Level                              2.2


               Total Bilirubin                             0.0  L


               Direct Bilirubin                            0.00


               Indirect Bilirubin                           0.0


               Aspartate Amino Transf
(AST/SGOT)            16  



               Alanine Aminotransferase
(ALT/SGPT)         11  L



               Alkaline Phosphatase                          73


               Total Protein                                6.7


               Albumin                                      3.8


               Globulin                                    2.90


               Albumin/Globulin Ratio                      1.31


               Triglycerides Level                         156  H


               Cholesterol Level                            124


               LDL Cholesterol, Calculated                   61


               HDL Cholesterol                              32  L


               Cholesterol/HDL Ratio                        3.8


               Thyroid Stimulating Hormone
(TSH)         2.160  









Medications


Medication





Current Medications


IV Flush (NS 3 ml) 3 ml PER PROTOCOL IV ;  Start 2/10/19 at 00:30


Ondansetron HCl (Zofran Tab) 4 mg Q6H  PRN PO NAUSEA/VOMITING;  Start 2/10/19 at


00:30


Acetaminophen (Tylenol Tab) 650 mg Q6H  PRN PO .PAIN 1-3 OR TEMP;  Start 2/10/19


at 00:30


Docusate Sodium (Colace) 100 mg Q12H  PRN PO .CONSTIPATION Last administered on 


2/10/19at 20:47; Admin Dose 100 MG;  Start 2/10/19 at 00:30


Bisacodyl (Dulcolax) 5 mg DAILY  PRN PO .CONSTIPATION;  Start 2/10/19 at 00:30


Paroxetine HCl (Paxil) 10 mg DAILY PO  Last administered on 2/11/19at 09:02; 


Admin Dose 10 MG;  Start 2/10/19 at 09:00


Sevelamer Carbonate (Renvela) 800 mg WITH  MEALS PO  Last administered on 


2/11/19at 09:02; Admin Dose 800 MG;  Start 2/10/19 at 07:35


Hydralazine HCl (Apresoline) 10 mg Q4H  PRN IV ELEVATED BLOOD PRESSURE;  Start 


2/10/19 at 01:00


Pantoprazole (Protonix Tab) 40 mg DAILY@06 PO  Last administered on 2/11/19at 


05:27; Admin Dose 40 MG;  Start 2/10/19 at 06:00


Atorvastatin Calcium (Lipitor) 80 mg QHS PO  Last administered on 2/10/19at 


20:47; Admin Dose 80 MG;  Start 2/10/19 at 21:00


Carvedilol (Coreg) 6.25 mg BID PO  Last administered on 2/10/19at 20:47; Admin 


Dose 6.25 MG;  Start 2/10/19 at 09:00


Losartan Potassium (Cozaar) 100 mg DAILY PO  Last administered on 2/10/19at 


08:24; Admin Dose 100 MG;  Start 2/10/19 at 09:00


Nifedipine (Procardia Xl) 90 mg DAILY PO  Last administered on 2/10/19at 08:26; 


Admin Dose 90 MG;  Start 2/10/19 at 09:00


Heparin Sodium (Porcine) (Heparin (1000 Units/ml)) 4,000 unit AFTER  DIALYSIS 


CATHETER ;  Start 2/10/19 at 09:00


Albumin Human 100 ml @  100 mls/hr WITH DIALYSIS  PRN IV SBP <90 DURING 


DIALYSIS;  Start 2/10/19 at 09:00


Epoetin Brian (Epogen (Esrd)) 6,000 units MoWeFr@17 SC ;  Start 2/11/19 at 17:00


Mupirocin (Bactroban) 1 applic BID TOP  Last administered on 2/11/19at 10:17; 


Admin Dose 1 APPLIC;  Start 2/11/19 at 09:30;  Stop 2/18/19 at 09:29











LUIS A TRACY MD                 Feb 11, 2019 12:34

## 2019-02-11 NOTE — NUR
OT NOTE: 

 

Attempted to see pt for skilled OT- pt was receiving dialysis. Will try back tomorrow.

## 2019-02-12 VITALS — RESPIRATION RATE: 20 BRPM | DIASTOLIC BLOOD PRESSURE: 60 MMHG | HEART RATE: 60 BPM | SYSTOLIC BLOOD PRESSURE: 131 MMHG

## 2019-02-12 VITALS — RESPIRATION RATE: 16 BRPM | HEART RATE: 57 BPM | SYSTOLIC BLOOD PRESSURE: 129 MMHG | DIASTOLIC BLOOD PRESSURE: 56 MMHG

## 2019-02-12 VITALS — RESPIRATION RATE: 18 BRPM | HEART RATE: 62 BPM | SYSTOLIC BLOOD PRESSURE: 123 MMHG | DIASTOLIC BLOOD PRESSURE: 69 MMHG

## 2019-02-12 VITALS — DIASTOLIC BLOOD PRESSURE: 56 MMHG | SYSTOLIC BLOOD PRESSURE: 118 MMHG | HEART RATE: 66 BPM | RESPIRATION RATE: 16 BRPM

## 2019-02-12 LAB
ADD MAN DIFF?: NO
ALBUMIN: 4 G/DL (ref 3.3–4.9)
ANION GAP: 15 (ref 5–13)
ANION GAP: 9 (ref 5–13)
BASOPHIL #: 0 10^3/UL (ref 0–0.1)
BASOPHILS %: 0.5 % (ref 0–2)
BLOOD UREA NITROGEN: 59 MG/DL (ref 7–20)
BLOOD UREA NITROGEN: 71 MG/DL (ref 7–20)
CALCIUM: 9.3 MG/DL (ref 8.4–10.2)
CALCIUM: 9.6 MG/DL (ref 8.4–10.2)
CARBON DIOXIDE: 24 MMOL/L (ref 21–31)
CARBON DIOXIDE: 28 MMOL/L (ref 21–31)
CHLORIDE: 103 MMOL/L (ref 97–110)
CHLORIDE: 104 MMOL/L (ref 97–110)
CREATININE: 4.99 MG/DL (ref 0.44–1)
CREATININE: 5.26 MG/DL (ref 0.44–1)
EOSINOPHILS #: 0.1 10^3/UL (ref 0–0.5)
EOSINOPHILS %: 0.9 % (ref 0–7)
GLUCOSE: 121 MG/DL (ref 70–220)
GLUCOSE: 92 MG/DL (ref 70–220)
HEMATOCRIT: 28.4 % (ref 37–47)
HEMOGLOBIN: 8.9 G/DL (ref 12–16)
IMMATURE GRANS #M: 0.04 10^3/UL (ref 0–0.03)
IMMATURE GRANS % (M): 0.7 % (ref 0–0.43)
LYMPHOCYTES #: 1.5 10^3/UL (ref 0.8–2.9)
LYMPHOCYTES %: 26.4 % (ref 15–51)
MAGNESIUM: 2.1 MG/DL (ref 1.7–2.5)
MEAN CORPUSCULAR HEMOGLOBIN: 29.9 PG (ref 29–33)
MEAN CORPUSCULAR HGB CONC: 31.3 G/DL (ref 32–37)
MEAN CORPUSCULAR VOLUME: 95.3 FL (ref 82–101)
MEAN PLATELET VOLUME: 9.6 FL (ref 7.4–10.4)
MONOCYTE #: 0.5 10^3/UL (ref 0.3–0.9)
MONOCYTES %: 9 % (ref 0–11)
NEUTROPHIL #: 3.6 10^3/UL (ref 1.6–7.5)
NEUTROPHILS %: 62.5 % (ref 39–77)
NUCLEATED RED BLOOD CELLS #: 0 10^3/UL (ref 0–0)
NUCLEATED RED BLOOD CELLS%: 0 /100WBC (ref 0–0)
OCCULT BLOOD STOOL: NEGATIVE
PHOSPHORUS: 4.1 MG/DL (ref 2.5–4.9)
PHOSPHORUS: 4.2 MG/DL (ref 2.5–4.9)
PLATELET COUNT: 227 10^3/UL (ref 140–415)
POTASSIUM: 5.4 MMOL/L (ref 3.5–5.1)
POTASSIUM: 6 MMOL/L (ref 3.5–5.1)
RED BLOOD COUNT: 2.98 10^6/UL (ref 4.2–5.4)
RED CELL DISTRIBUTION WIDTH: 14 % (ref 11.5–14.5)
SODIUM: 141 MMOL/L (ref 135–144)
SODIUM: 142 MMOL/L (ref 135–144)
WHITE BLOOD COUNT: 5.7 10^3/UL (ref 4.8–10.8)

## 2019-02-12 RX ADMIN — MUPIROCIN 1 APPLIC: 20 OINTMENT TOPICAL at 20:41

## 2019-02-12 RX ADMIN — NIFEDIPINE 1 MG: 90 TABLET, FILM COATED, EXTENDED RELEASE ORAL at 09:13

## 2019-02-12 RX ADMIN — PAROXETINE SCH MG: 10 TABLET, FILM COATED ORAL at 09:13

## 2019-02-12 RX ADMIN — SEVELAMER CARBONATE SCH MG: 800 TABLET, FILM COATED ORAL at 17:55

## 2019-02-12 RX ADMIN — SEVELAMER CARBONATE 1 MG: 800 TABLET, FILM COATED ORAL at 09:12

## 2019-02-12 RX ADMIN — NIFEDIPINE SCH MG: 90 TABLET, FILM COATED, EXTENDED RELEASE ORAL at 09:13

## 2019-02-12 RX ADMIN — SEVELAMER CARBONATE 1 MG: 800 TABLET, FILM COATED ORAL at 17:55

## 2019-02-12 RX ADMIN — ATORVASTATIN CALCIUM 1 MG: 80 TABLET, FILM COATED ORAL at 20:42

## 2019-02-12 RX ADMIN — MUPIROCIN 1 APPLIC: 20 OINTMENT TOPICAL at 09:13

## 2019-02-12 RX ADMIN — SEVELAMER CARBONATE 1 MG: 800 TABLET, FILM COATED ORAL at 11:30

## 2019-02-12 RX ADMIN — MUPIROCIN SCH APPLIC: 20 OINTMENT TOPICAL at 09:13

## 2019-02-12 RX ADMIN — MUPIROCIN SCH APPLIC: 20 OINTMENT TOPICAL at 20:41

## 2019-02-12 RX ADMIN — PAROXETINE 1 MG: 10 TABLET, FILM COATED ORAL at 09:13

## 2019-02-12 RX ADMIN — LOSARTAN POTASSIUM SCH MG: 50 TABLET, FILM COATED ORAL at 09:13

## 2019-02-12 RX ADMIN — SODIUM POLYSTYRENE SULFONATE 1 GM: 4.1 POWDER, FOR SUSPENSION ORAL; RECTAL at 10:19

## 2019-02-12 RX ADMIN — PANTOPRAZOLE SODIUM SCH MG: 40 TABLET, DELAYED RELEASE ORAL at 06:17

## 2019-02-12 RX ADMIN — ATORVASTATIN CALCIUM SCH MG: 80 TABLET, FILM COATED ORAL at 20:42

## 2019-02-12 RX ADMIN — PANTOPRAZOLE SODIUM 1 MG: 40 TABLET, DELAYED RELEASE ORAL at 06:17

## 2019-02-12 RX ADMIN — SEVELAMER CARBONATE SCH MG: 800 TABLET, FILM COATED ORAL at 11:30

## 2019-02-12 RX ADMIN — LOSARTAN POTASSIUM 1 MG: 50 TABLET ORAL at 09:13

## 2019-02-12 RX ADMIN — SEVELAMER CARBONATE SCH MG: 800 TABLET, FILM COATED ORAL at 09:12

## 2019-02-12 NOTE — DS
Date/Time of Note


Date/Time of Note


DATE: 2/12/19 


TIME: 17:50





Discharge Summary


Admission/Discharge Info


Admit Date/Time


Feb 10, 2019 at 00:18


Discharge Date/Time





Discharge Diagnosis


ESRD


Patient Condition:  Stable


Hospital Course


Patient was found to have anemia of CKD.  She was transfused 1 unit of PRBCs.  


FOBT was negative.  She underwent regularly scheduled HD treatments.  She was 


evaluated by PT who offered [facility] vs [facility] referral. She preferred discharge home 


instead.


Home Meds


Reported Medications


Sevelamer Hcl* (Renagel*) 800 Mg Tablet, 800 MG PO WITH MEALS, TAB


   10/15/18


Paroxetine Hcl* (Paxil*) 10 Mg Tablet, 10 MG PO DAILY, TAB


   10/15/18


Omeprazole* (Omeprazole*) 20 Mg Capsule.dr, 20 MG PO DAILY, #30 CAP


   10/15/18


Omega-3/Dha/Epa/Fish Oil (FISH  MG SOFTGEL) 1 Each Capsule, 1 EACH PO 


BID, CAP


   10/15/18


Nifedipine* (Nifedipine ER*) 90 Mg Tablet.er, 90 MG PO DAILY, TAB


   10/15/18


Multivitamin* (Daily Value*) 1 Each Tablet, 1 TAB PO DAILY, TAB


   10/15/18


Losartan Potassium* (Losartan Potassium*) 100 Mg Tablet, 100 MG PO DAILY, TAB


   10/15/18


Hydralazine Hcl* (Hydralazine Hcl*) 50 Mg Tab, 50 MG PO TID, #90 TAB


   10/15/18


Furosemide* (Furosemide*) 40 Mg Tablet, 40 MG PO DAILY, TAB


   10/15/18


Carvedilol* (Carvedilol*) 6.25 Mg Tablet, 6.25 MG PO BID, #60 TAB


   10/15/18


Atorvastatin* (Atorvastatin*) 80 Mg Tablet, 80 MG PO QHS, #30 TAB


   10/15/18


Aspirin (Low Dose Aspirin) 81 Mg Tablet.dr, 81 MG PO DAILY, #30 TAB


   10/15/18


Follow-up Plan


Continue dialysis and your medications as prescribed


Primary Care Provider


Not On Staff Doctor


Pending Labs





Laboratory Tests


Test
              2/12/19
06:02   2/12/19
07:39   2/12/19
07:41   2/12/19
14:35


White Blood                  5.7  
               
               



Count
           10^3/ul
(4.8-10


                             .8)


Red Blood                   2.98  
               
               



Count
           10^6/ul
(4.20-5


                            .40)


Hemoglobin
                  8.9  
               
               



                 g/dl
(12.0-16.0


                               )


Hematocrit
                 28.4  
               
               



                   %
(37.0-47.0)


Mean                        95.3  
               
               



Corpuscular      fl
(82.0-101.0)


Volume



Mean                        29.9  
               
               



Corpuscular       pg
(29.0-33.0)


Hemoglobin



Mean                        31.3  
               
               



Corpuscular      g/dl
(32.0-37.0


Hemoglobin
Conc                )


ent


Red Cell                    14.0  
               
               



Distribution       %
(11.5-14.5)


Width



Platelet Count
              227  
               
               



                 10^3/UL
(140-41


                              5)


Mean Platelet                9.6  
               
               



Volume
            fl
(7.4-10.4)


Immature                   0.700  
               
               



Granulocytes %
  %
(0.001-0.429)


Neutrophils %
              62.5  
               
               



                   %
(39.0-77.0)


Lymphocytes %
              26.4  
               
               



                   %
(15.0-51.0)


Monocytes %
                 9.0  
               
               



                    %
(0.0-11.0)


Eosinophils %
   0.9 %
(0.0-7.0)  
               
               



Basophils %
     0.5 %
(0.0-2.0)  
               
               



Nucleated Red                0.0  
               
               



Blood Cells %
   /100WBC
(0.0-0.


                              0)


Immature                   0.040  
               
               



Granulocytes #
  10^3/ul
(0.0-0.


                            031)


Neutrophils #
               3.6  
               
               



                 10^3/ul
(1.6-7.


                              5)


Lymphocytes #
               1.5  
               
               



                 10^3/ul
(0.8-2.


                              9)


Monocytes #
                 0.5  
               
               



                 10^3/ul
(0.3-0.


                              9)


Eosinophils #
               0.1  
               
               



                 10^3/ul
(0.0-0.


                              5)


Basophils #
                 0.0  
               
               



                 10^3/ul
(0.0-0.


                              1)


Nucleated Red                0.0  
               
               



Blood Cells #
   10^3/ul
(0.0-0.


                              0)


Phosphorus                   4.1  
                          4.2  



Level
           mg/dl
(2.5-4.9)                  mg/dl
(2.5-4.9


                                                               )


Lab Scanned      
                BLOOD
TRANSFUS  
               



Report
                           ION


Sodium Level
    
                
                          141             142


                                                  mmol/L
(135-14  mmol/L
(135-14


                                                              4)              4)


Potassium        
                
                          6.0             5.4


Level
                                            mmol/L
(3.5-5.  mmol/L
(3.5-5.


                                                              1)              1)


Chloride Level
  
                
                          104             103


                                                  mmol/L
(  mmol/L
(


                                                               )               )


Carbon Dioxide   
                
                           28              24


Level
                                            mmol/L
(21-31)  mmol/L
(21-31)


Anion Gap                                              9 (5-13)       15 (5-13)


Blood Urea       
                
                           59              71


Nitrogen
                                           mg/dl
(7-20)    mg/dl
(7-20)


Creatinine
      
                
                         4.99            5.26


                                                  mg/dl
(0.44-1.  mg/dl
(0.44-1.


                                                             00)             00)


Glucose Level
   
                
                          121              92


                                                  mg/dl
()  mg/dl
()


Calcium Level
   
                
                          9.3             9.6


                                                  mg/dl
(8.4-10.  mg/dl
(8.4-10.


                                                              2)              2)


Magnesium        
                
                          2.1  



Level
                                            mg/dl
(1.7-2.5


                                                               )


Albumin
         
                
                          4.0  



                                                  g/dl
(3.3-4.9)


Est Glomerular   
                
               
                mL/min (>60)


Filtrat                                                           



Rate
mL/min














MICHAEL CABRERA MD          Feb 12, 2019 17:51

## 2019-02-12 NOTE — NUR
OT NOTE: 

 

Order received, chart reviewed. Pt presents with increased potassium- will Hold OT until 
potassium levels decrease. Thank you.

## 2019-02-12 NOTE — NUR
Called [facility] and scheduled pt for HD tomorrow on first shift with a start time between 
8-11am. Per Sandra confirmation #[id number]

## 2019-02-12 NOTE — NUR
Patient is alert and oriented x4, denies any pain. Able to ambulate with standby assistance. 
Isolation precautions in place. Hourly rounding provided, call light within reach. Unable to 
collect stool for OB, will endorse to upcoming nurse accordingly.

## 2019-02-12 NOTE — PDOCDIS
Discharge Instructions


DIAGNOSIS


Discharge Diagnosis


ESRD





CONDITION


                [id number]
Patient Condition:  [id number]
Stable








FOLLOW UP/APPOINTMENTS


Follow-up Plan


Continue dialysis and your medications as prescribed











MICHAEL CABRERA MD          Feb 12, 2019 17:49

## 2019-02-12 NOTE — CONS
Assessment/Plan


Assessment/Plan


Assessment/Plan (Daily)


1. acute fluid overload- Resolved 


2. Severe symptomatic anemia


3. ESRD on HD MWF through chest permacath 


4. h/o HTN 


5. H/o HL 


6. h/o paryoxysmal atrial fibillation 





Plan:


s/p HD yesterday 2.5 L removed, Plan for HD tomorrow, will continue HD on MWF 


Epogen 6000 units SQ MWF 


will follow up





Consultation Date/Type/Reason


Admit Date/Time


Feb 10, 2019 at 00:18


Initial Consult Date


2/10/19


Type of Consult


NEPHROLOGY


Requesting Provider:  FELIZ TUTTLE


Date/Time of Note


DATE: 2/12/19 


TIME: 09:57





24 HR Interval Summary


Free Text/Dictation


s/p HD yesterday, tolerated well, no fever, no chills





Exam/Review of Systems


Exam


Vitals





Vital Signs


  Date      Temp  Pulse  Resp  B/P (MAP)   Pulse Ox  O2          O2 Flow    FiO2


Time                                                 Delivery    Rate


   2/12/19  98.0     57    16      129/56        97


     08:00                           (80)


   2/12/19                                           Room Air


     03:08








Intake and Output





2/11/19 2/11/19 2/12/19





1515:00


23:00


07:00





IntakeIntake Total


900 ml





OutputOutput Total


3000 ml





BalanceBalance


-3000 ml


900 ml











Exam


Constitutional:  alert, awake 


Respiratory:  clear to auscultation, normal air movement, diminished breath 


sounds


Cardiovascular:  regular rate and rhythm


Gastrointestinal:  soft, non-tender


Musculoskeletal:  nl extremities to inspection


Extremities:  normal pulses


Neurological:  CNS II-XII intact, nl mental status





Results


Result Diagram:  


2/12/19 0602                                                                    


           2/12/19 0741





Results 24hrs





Laboratory Tests


Test
              2/11/19
11:15  2/12/19
06:02     2/12/19
07:39  2/12/19
07:41


Hepatitis B        NEGATIVE


Surface Antigen


Hepatitis B Core   NEGATIVE  
    
              
                 



Total
Antibody


White Blood Count                         5.7


Red Blood Count                         2.98  L


Hemoglobin                               8.9  L


Hematocrit                              28.4  L


Mean Corpuscular                         95.3


Volume


Mean Corpuscular                         29.9


Hemoglobin


Mean Corpuscular   
                   31.3  L
  
                 



Hemoglobin
Concen


t


Red Cell                                 14.0


Distribution


Width


Platelet Count                            227


Mean Platelet                             9.6


Volume


Immature                               0.700  H


Granulocytes %


Neutrophils %                            62.5


Lymphocytes %                            26.4


Monocytes %                               9.0


Eosinophils %                             0.9


Basophils %                               0.5


Nucleated Red                             0.0


Blood Cells %


Immature                               0.040  H


Granulocytes #


Neutrophils #                             3.6


Lymphocytes #                             1.5


Monocytes #                               0.5


Eosinophils #                             0.1


Basophils #                               0.0


Nucleated Red                             0.0


Blood Cells #


Phosphorus Level                          4.1                              4.2


Lab Scanned        
              
              BLOOD
TRANSFUSIO  



Report
                                          N


Sodium Level                                                               141


Potassium Level                                                           6.0  H


Chloride Level                                                             104


Carbon Dioxide                                                              28


Level


Anion Gap                                                                    9


Blood Urea                                                                59  #H


Nitrogen


Creatinine                                                              4.99  #H


Glucose Level                                                              121


Calcium Level                                                              9.3


Magnesium Level                                                            2.1


Albumin                                                                    4.0








Medications


Medication





Current Medications


IV Flush (NS 3 ml) 3 ml PER PROTOCOL IV ;  Start 2/10/19 at 00:30


Ondansetron HCl (Zofran Tab) 4 mg Q6H  PRN PO NAUSEA/VOMITING;  Start 2/10/19 at


00:30


Acetaminophen (Tylenol Tab) 650 mg Q6H  PRN PO .PAIN 1-3 OR TEMP;  Start 2/10/19


at 00:30


Docusate Sodium (Colace) 100 mg Q12H  PRN PO .CONSTIPATION Last administered on 


2/10/19at 20:47; Admin Dose 100 MG;  Start 2/10/19 at 00:30


Bisacodyl (Dulcolax) 5 mg DAILY  PRN PO .CONSTIPATION;  Start 2/10/19 at 00:30


Paroxetine HCl (Paxil) 10 mg DAILY PO  Last administered on 2/12/19at 09:13; 


Admin Dose 10 MG;  Start 2/10/19 at 09:00


Sevelamer Carbonate (Renvela) 800 mg WITH  MEALS PO  Last administered on 


2/12/19at 09:12; Admin Dose 800 MG;  Start 2/10/19 at 07:35


Hydralazine HCl (Apresoline) 10 mg Q4H  PRN IV ELEVATED BLOOD PRESSURE Last 


administered on 2/11/19at 12:47; Admin Dose 10 MG;  Start 2/10/19 at 01:00


Pantoprazole (Protonix Tab) 40 mg DAILY@06 PO  Last administered on 2/12/19at 


06:17; Admin Dose 40 MG;  Start 2/10/19 at 06:00


Atorvastatin Calcium (Lipitor) 80 mg QHS PO  Last administered on 2/10/19at 


20:47; Admin Dose 80 MG;  Start 2/10/19 at 21:00


Carvedilol (Coreg) 6.25 mg BID PO  Last administered on 2/11/19at 20:14; Admin 


Dose 6.25 MG;  Start 2/10/19 at 09:00


Losartan Potassium (Cozaar) 100 mg DAILY PO  Last administered on 2/12/19at 09:1


3; Admin Dose 100 MG;  Start 2/10/19 at 09:00


Nifedipine (Procardia Xl) 90 mg DAILY PO  Last administered on 2/12/19at 09:13; 


Admin Dose 90 MG;  Start 2/10/19 at 09:00


Heparin Sodium (Porcine) (Heparin (1000 Units/ml)) 4,000 unit AFTER  DIALYSIS 


CATHETER  Last administered on 2/11/19at 12:47; Admin Dose 3,400 UNIT;  Start 


2/10/19 at 09:00


Albumin Human 100 ml @  100 mls/hr WITH DIALYSIS  PRN IV SBP <90 DURING 


DIALYSIS;  Start 2/10/19 at 09:00


Epoetin Brian (Epogen (Esrd)) 6,000 units MoWeFr@17 SC  Last administered on 


2/11/19at 18:11; Admin Dose 6,000 UNITS;  Start 2/11/19 at 17:00


Mupirocin (Bactroban) 1 applic BID TOP  Last administered on 2/12/19at 09:13; 


Admin Dose 1 APPLIC;  Start 2/11/19 at 09:30;  Stop 2/18/19 at 09:29











RUBY ZUNIGA MD           Feb 12, 2019 09:57

## 2019-02-12 NOTE — NUR
Pt has DC order tonight by Dr. Calderón, pt doesn't want to go home at this time and per pt 
nephew will come zora am to pick her up ,no family member available at time as well. Dr. Gr 
made aware of the situation per MD can hold DC tonight and may go home zora 2/13

## 2019-02-13 VITALS — HEART RATE: 61 BPM | SYSTOLIC BLOOD PRESSURE: 130 MMHG | DIASTOLIC BLOOD PRESSURE: 60 MMHG | RESPIRATION RATE: 18 BRPM

## 2019-02-13 VITALS — HEART RATE: 57 BPM | RESPIRATION RATE: 18 BRPM | DIASTOLIC BLOOD PRESSURE: 67 MMHG | SYSTOLIC BLOOD PRESSURE: 169 MMHG

## 2019-02-13 VITALS — SYSTOLIC BLOOD PRESSURE: 166 MMHG | HEART RATE: 72 BPM | DIASTOLIC BLOOD PRESSURE: 72 MMHG

## 2019-02-13 VITALS — SYSTOLIC BLOOD PRESSURE: 148 MMHG | HEART RATE: 57 BPM | DIASTOLIC BLOOD PRESSURE: 61 MMHG

## 2019-02-13 VITALS — HEART RATE: 56 BPM | SYSTOLIC BLOOD PRESSURE: 171 MMHG | DIASTOLIC BLOOD PRESSURE: 64 MMHG

## 2019-02-13 VITALS — SYSTOLIC BLOOD PRESSURE: 170 MMHG | DIASTOLIC BLOOD PRESSURE: 62 MMHG | HEART RATE: 55 BPM

## 2019-02-13 VITALS — HEART RATE: 65 BPM | DIASTOLIC BLOOD PRESSURE: 71 MMHG | SYSTOLIC BLOOD PRESSURE: 153 MMHG

## 2019-02-13 VITALS — SYSTOLIC BLOOD PRESSURE: 169 MMHG | DIASTOLIC BLOOD PRESSURE: 67 MMHG | HEART RATE: 58 BPM

## 2019-02-13 VITALS — SYSTOLIC BLOOD PRESSURE: 169 MMHG | HEART RATE: 57 BPM | DIASTOLIC BLOOD PRESSURE: 67 MMHG

## 2019-02-13 VITALS — DIASTOLIC BLOOD PRESSURE: 65 MMHG | HEART RATE: 57 BPM | SYSTOLIC BLOOD PRESSURE: 167 MMHG | RESPIRATION RATE: 18 BRPM

## 2019-02-13 VITALS — DIASTOLIC BLOOD PRESSURE: 67 MMHG | SYSTOLIC BLOOD PRESSURE: 180 MMHG | HEART RATE: 57 BPM

## 2019-02-13 VITALS — DIASTOLIC BLOOD PRESSURE: 77 MMHG | HEART RATE: 98 BPM | RESPIRATION RATE: 20 BRPM | SYSTOLIC BLOOD PRESSURE: 170 MMHG

## 2019-02-13 VITALS — DIASTOLIC BLOOD PRESSURE: 73 MMHG | SYSTOLIC BLOOD PRESSURE: 167 MMHG | HEART RATE: 65 BPM

## 2019-02-13 VITALS — DIASTOLIC BLOOD PRESSURE: 64 MMHG | HEART RATE: 55 BPM | SYSTOLIC BLOOD PRESSURE: 171 MMHG

## 2019-02-13 VITALS — SYSTOLIC BLOOD PRESSURE: 147 MMHG | DIASTOLIC BLOOD PRESSURE: 63 MMHG | HEART RATE: 68 BPM

## 2019-02-13 VITALS — DIASTOLIC BLOOD PRESSURE: 69 MMHG | SYSTOLIC BLOOD PRESSURE: 155 MMHG | HEART RATE: 68 BPM | RESPIRATION RATE: 20 BRPM

## 2019-02-13 VITALS — HEART RATE: 66 BPM | DIASTOLIC BLOOD PRESSURE: 41 MMHG | SYSTOLIC BLOOD PRESSURE: 151 MMHG

## 2019-02-13 VITALS — SYSTOLIC BLOOD PRESSURE: 181 MMHG | HEART RATE: 57 BPM | DIASTOLIC BLOOD PRESSURE: 69 MMHG

## 2019-02-13 LAB
ADD MAN DIFF?: NO
ALANINE AMINOTRANSFERASE: 21 IU/L (ref 13–69)
ALBUMIN/GLOBULIN RATIO: 1.42
ALBUMIN: 4 G/DL (ref 3.3–4.9)
ALKALINE PHOSPHATASE: 85 IU/L (ref 42–121)
ANION GAP: 14 (ref 5–13)
ASPARTATE AMINO TRANSFERASE: 18 IU/L (ref 15–46)
BASOPHIL #: 0 10^3/UL (ref 0–0.1)
BASOPHILS %: 0.8 % (ref 0–2)
BILIRUBIN,DIRECT: 0 MG/DL (ref 0–0.2)
BILIRUBIN,TOTAL: 0 MG/DL (ref 0.2–1.3)
BLOOD UREA NITROGEN: 84 MG/DL (ref 7–20)
CALCIUM: 9.2 MG/DL (ref 8.4–10.2)
CARBON DIOXIDE: 26 MMOL/L (ref 21–31)
CHLORIDE: 100 MMOL/L (ref 97–110)
CREATININE: 7.04 MG/DL (ref 0.44–1)
EOSINOPHILS #: 0.1 10^3/UL (ref 0–0.5)
EOSINOPHILS %: 1 % (ref 0–7)
GLOBULIN: 2.8 G/DL (ref 1.3–3.2)
GLUCOSE: 131 MG/DL (ref 70–220)
HEMATOCRIT: 29 % (ref 37–47)
HEMOGLOBIN: 9.1 G/DL (ref 12–16)
IMMATURE GRANS #M: 0.04 10^3/UL (ref 0–0.03)
IMMATURE GRANS % (M): 0.8 % (ref 0–0.43)
INR: 1.17
LYMPHOCYTES #: 1.7 10^3/UL (ref 0.8–2.9)
LYMPHOCYTES %: 34 % (ref 15–51)
MEAN CORPUSCULAR HEMOGLOBIN: 29.7 PG (ref 29–33)
MEAN CORPUSCULAR HGB CONC: 31.4 G/DL (ref 32–37)
MEAN CORPUSCULAR VOLUME: 94.8 FL (ref 82–101)
MEAN PLATELET VOLUME: 9.7 FL (ref 7.4–10.4)
MONOCYTE #: 0.5 10^3/UL (ref 0.3–0.9)
MONOCYTES %: 9.9 % (ref 0–11)
NEUTROPHIL #: 2.7 10^3/UL (ref 1.6–7.5)
NEUTROPHILS %: 53.5 % (ref 39–77)
NUCLEATED RED BLOOD CELLS #: 0 10^3/UL (ref 0–0)
NUCLEATED RED BLOOD CELLS%: 0 /100WBC (ref 0–0)
PARTIAL THROMBOPLASTIN TIME: 30.9 SEC (ref 23–35)
PHOSPHORUS: 5.4 MG/DL (ref 2.5–4.9)
PLATELET COUNT: 237 10^3/UL (ref 140–415)
POTASSIUM: 4.8 MMOL/L (ref 3.5–5.1)
PROTIME: 15 SEC (ref 11.9–14.9)
PT RATIO: 1.2
RED BLOOD COUNT: 3.06 10^6/UL (ref 4.2–5.4)
RED CELL DISTRIBUTION WIDTH: 14 % (ref 11.5–14.5)
SODIUM: 140 MMOL/L (ref 135–144)
TOTAL PROTEIN: 6.8 G/DL (ref 6.1–8.1)
WHITE BLOOD COUNT: 5 10^3/UL (ref 4.8–10.8)

## 2019-02-13 RX ADMIN — LOSARTAN POTASSIUM 1 MG: 50 TABLET ORAL at 08:41

## 2019-02-13 RX ADMIN — SEVELAMER CARBONATE 1 MG: 800 TABLET, FILM COATED ORAL at 08:42

## 2019-02-13 RX ADMIN — HEPARIN SODIUM SCH UNIT: 1000 INJECTION, SOLUTION INTRAVENOUS; SUBCUTANEOUS at 13:28

## 2019-02-13 RX ADMIN — SEVELAMER CARBONATE 1 MG: 800 TABLET, FILM COATED ORAL at 12:57

## 2019-02-13 RX ADMIN — PAROXETINE SCH MG: 10 TABLET, FILM COATED ORAL at 08:42

## 2019-02-13 RX ADMIN — SEVELAMER CARBONATE SCH MG: 800 TABLET, FILM COATED ORAL at 08:42

## 2019-02-13 RX ADMIN — PANTOPRAZOLE SODIUM SCH MG: 40 TABLET, DELAYED RELEASE ORAL at 05:34

## 2019-02-13 RX ADMIN — SEVELAMER CARBONATE SCH MG: 800 TABLET, FILM COATED ORAL at 12:57

## 2019-02-13 RX ADMIN — PAROXETINE 1 MG: 10 TABLET, FILM COATED ORAL at 08:42

## 2019-02-13 RX ADMIN — MUPIROCIN SCH APPLIC: 20 OINTMENT TOPICAL at 08:42

## 2019-02-13 RX ADMIN — MUPIROCIN 1 APPLIC: 20 OINTMENT TOPICAL at 08:42

## 2019-02-13 RX ADMIN — HEPARIN SODIUM 1 UNIT: 1000 INJECTION, SOLUTION INTRAVENOUS; SUBCUTANEOUS at 13:28

## 2019-02-13 RX ADMIN — LOSARTAN POTASSIUM SCH MG: 50 TABLET, FILM COATED ORAL at 08:41

## 2019-02-13 RX ADMIN — PANTOPRAZOLE SODIUM 1 MG: 40 TABLET, DELAYED RELEASE ORAL at 05:34

## 2019-02-13 RX ADMIN — NIFEDIPINE 1 MG: 90 TABLET, FILM COATED, EXTENDED RELEASE ORAL at 08:42

## 2019-02-13 RX ADMIN — NIFEDIPINE SCH MG: 90 TABLET, FILM COATED, EXTENDED RELEASE ORAL at 08:42

## 2019-02-13 NOTE — CONS
Assessment/Plan


Assessment/Plan


Assessment/Plan (Daily)


1. acute fluid overload- Resolved 


2. Severe symptomatic anemia


3. ESRD on HD MWF through chest permacath 


4. h/o HTN 


5. H/o HL 


6. h/o paryoxysmal atrial fibillation 





Plan:


plan for HD today, ok to d/c home after HD today , will continue HD on MWF 


Epogen 6000 units SQ MWF 


will follow up





Consultation Date/Type/Reason


Admit Date/Time


Feb 10, 2019 at 00:18


Initial Consult Date


2/10/19


Type of Consult


NEPHROLOGY


Requesting Provider:  FELIZ TUTTLE


Date/Time of Note


DATE: 2/13/19 


TIME: 09:02





Exam/Review of Systems


Exam


Vitals





Vital Signs


  Date      Temp  Pulse  Resp  B/P (MAP)   Pulse Ox  O2          O2 Flow    FiO2


Time                                                 Delivery    Rate


   2/13/19  97.8     57    18      169/67        96


     08:00                          (101)


   2/12/19                                           Room Air


     03:08








Intake and Output





2/12/19 2/12/19 2/13/19





1515:00


23:00


07:00





IntakeIntake Total


400 ml


1500 ml





OutputOutput Total


100 ml





BalanceBalance


400 ml


1400 ml














Results


Result Diagram:  


2/13/19 0617                                                                    


           2/13/19 0616





Results 24hrs





Laboratory Tests


Test
                                2/12/19
14:35  2/13/19
06:16  2/13/19
06:17


Sodium Level                                 142            140


Potassium Level                             5.4  H          4.8


Chloride Level                               103            100


Carbon Dioxide Level                          24             26


Anion Gap                                    15  H          14  H


Blood Urea Nitrogen                          71  H          84  H


Creatinine                                 5.26  H        7.04  H


Est Glomerular Filtrat Rate
mL/min     
              
            



Glucose Level                                 92            131


Calcium Level                                9.6            9.2


Prothrombin Time                                          15.0  H


Prothrombin Time Ratio                                      1.2


INR International Normalized
Ratio   
                    1.17  
  



Activated Partial
Thromboplast Time  
                    30.9  
  



Phosphorus Level                                           5.4  H


Total Bilirubin                                            0.0  L


Direct Bilirubin                                           0.00


Indirect Bilirubin                                          0.0


Aspartate Amino Transf
(AST/SGOT)    
                      18  
  



Alanine Aminotransferase
(ALT/SGPT)  
                      21  
  



Alkaline Phosphatase                                         85


Total Protein                                               6.8


Albumin                                                     4.0


Globulin                                                   2.80


Albumin/Globulin Ratio                                     1.42


White Blood Count                                                          5.0


Red Blood Count                                                          3.06  L


Hemoglobin                                                                9.1  L


Hematocrit                                                               29.0  L


Mean Corpuscular Volume                                                   94.8


Mean Corpuscular Hemoglobin                                               29.7


Mean Corpuscular Hemoglobin
Concent  
              
                   31.4  L



Red Cell Distribution Width                                               14.0


Platelet Count                                                             237


Mean Platelet Volume                                                       9.7


Immature Granulocytes %                                                 0.800  H


Neutrophils %                                                             53.5


Lymphocytes %                                                             34.0


Monocytes %                                                                9.9


Eosinophils %                                                              1.0


Basophils %                                                                0.8


Nucleated Red Blood Cells %                                                0.0


Immature Granulocytes #                                                 0.040  H


Neutrophils #                                                              2.7


Lymphocytes #                                                              1.7


Monocytes #                                                                0.5


Eosinophils #                                                              0.1


Basophils #                                                                0.0


Nucleated Red Blood Cells #                                                0.0








Medications


Medication





Current Medications


IV Flush (NS 3 ml) 3 ml PER PROTOCOL IV ;  Start 2/10/19 at 00:30


Ondansetron HCl (Zofran Tab) 4 mg Q6H  PRN PO NAUSEA/VOMITING;  Start 2/10/19 at


00:30


Acetaminophen (Tylenol Tab) 650 mg Q6H  PRN PO .PAIN 1-3 OR TEMP;  Start 2/10/19


at 00:30


Docusate Sodium (Colace) 100 mg Q12H  PRN PO .CONSTIPATION Last administered on 


2/10/19at 20:47; Admin Dose 100 MG;  Start 2/10/19 at 00:30


Bisacodyl (Dulcolax) 5 mg DAILY  PRN PO .CONSTIPATION;  Start 2/10/19 at 00:30


Paroxetine HCl (Paxil) 10 mg DAILY PO  Last administered on 2/13/19at 08:42; 


Admin Dose 10 MG;  Start 2/10/19 at 09:00


Sevelamer Carbonate (Renvela) 800 mg WITH  MEALS PO  Last administered on 


2/13/19at 08:42; Admin Dose 800 MG;  Start 2/10/19 at 07:35


Hydralazine HCl (Apresoline) 10 mg Q4H  PRN IV ELEVATED BLOOD PRESSURE Last 


administered on 2/11/19at 12:47; Admin Dose 10 MG;  Start 2/10/19 at 01:00


Pantoprazole (Protonix Tab) 40 mg DAILY@06 PO  Last administered on 2/13/19at 


05:34; Admin Dose 40 MG;  Start 2/10/19 at 06:00


Atorvastatin Calcium (Lipitor) 80 mg QHS PO  Last administered on 2/12/19at 


20:42; Admin Dose 80 MG;  Start 2/10/19 at 21:00


Carvedilol (Coreg) 6.25 mg BID PO  Last administered on 2/12/19at 20:42; Admin 


Dose 6.25 MG;  Start 2/10/19 at 09:00


Losartan Potassium (Cozaar) 100 mg DAILY PO  Last administered on 2/12/19at 


09:13; Admin Dose 100 MG;  Start 2/10/19 at 09:00


Nifedipine (Procardia Xl) 90 mg DAILY PO  Last administered on 2/12/19at 09:13; 


Admin Dose 90 MG;  Start 2/10/19 at 09:00


Heparin Sodium (Porcine) (Heparin (1000 Units/ml)) 4,000 unit AFTER  DIALYSIS 


CATHETER  Last administered on 2/11/19at 12:47; Admin Dose 3,400 UNIT;  Start 


2/10/19 at 09:00


Albumin Human 100 ml @  100 mls/hr WITH DIALYSIS  PRN IV SBP <90 DURING 


DIALYSIS;  Start 2/10/19 at 09:00


Epoetin Brian (Epogen (Esrd)) 6,000 units MoWeFr@17 SC  Last administered on 


2/11/19at 18:11; Admin Dose 6,000 UNITS;  Start 2/11/19 at 17:00


Mupirocin (Bactroban) 1 applic BID TOP  Last administered on 2/13/19at 08:42; 


Admin Dose 1 APPLIC;  Start 2/11/19 at 09:30;  Stop 2/18/19 at 09:29


Heparin Sodium (Porcine) (Heparin (1000 Units/ml)) 4,000 unit AFTER  DIALYSIS 


CATHETER ;  Start 2/12/19 at 10:00











RUBY ZUNIGA MD           Feb 13, 2019 09:02

## 2019-02-13 NOTE — NUR
OT EVAL 

Therapy day number 

1

Evaluation Start Time 

07:50

Evaluation End Time 

08:15

Evaluation Total Time 

25 min

Pain Intensity

0 (0-10)

Patient Stated Goal for Pain Relief 

0 (0-10)

Pain Scale

NUMERIC

Pain Level Comment 

no pain indicated

Feeding Mechanics 

Independent

Bed Mobility Supine to Sit

Independent

Transfer Sit to Stand Ability

Independent

Bed Mobility Sit to Supine

Independent

Toileting Ability 

Independent

Assistive Devices 

None

Hygiene Ability

Independent

Upper Extremity Dressing 

Independent

Lower Extremity Dressing 

Independent

Toileting Ability

Independent

Toilet Transfer Ability

Independent

Post Treatment Pain Intensity 

0 (0-10)

OT Technical Record Comment 

OT EVAL: 

      Patient is a 78 year old female admitted to [facility] after presenting to 

the ER 

on 02/09/19 with complaints of recent dizziness and weakness and was 

referred here by dialysis center for low hemoglobin.



PMH: Paroxysmal atrial fibrillation, hypertension, diabetes mellitus, 

end-stage renal disease on HD



PLOF: Patient lives with her sister and brother-in-law in a second floor 

apartment with 20 stairs to enter, bilateral rails. Prior to 

hospitalization patient was independent with all functional mobility 

without an assistive device, was able to negotiate the stairs without 

difficulty and was able to ambulate community distances. 



CLOF: RN cleared patient for OT evaluation, patient agreeable to 

tx stating 0/10 pain. Pt performed bed mob, transfers and functional mob 

independently without any AE. Pt proceeded to complete UB/LB dressing, 

toileting and h/g tasks independently with good safety awareness. Pt left 

supine in bed with all needs met. RN Notified.



OT RECOMMENDATION: Patient demonstrates independence with ADL's and 

functional mob. No skilled OT warranted at this time. Anticipate discharge 

home with family 

assistance as needed once cleared by MD. No DME needs anticipated at this 

time.

## 2019-02-13 NOTE — PN
Date/Time of Note


Date/Time of Note


DATE: 2/13/19 


TIME: 12:06





Assessment/Plan


VTE Prophylaxis


Risk score (from Ns)>0 risk:  5


SCD applied (from Nsg):  Yes


Pharmacological prophylaxis:  heparin





Lines/Catheters


IV Catheter Type (from Nrsg):  PERMACATH


Urinary Cath still in place:  No





Assessment/Plan


Hospital Course


Patient was found to have anemia of CKD.  She was transfused 1 unit of PRBCs.  


FOBT was negative.  She underwent regularly scheduled HD treatments.  She was 


evaluated by PT who offered BRANDY vs ARU referral. She preferred discharge home 


instead.


Result Diagram:  


2/13/19 0617                                                                    


           2/13/19 0616





Results 24hrs





Laboratory Tests


Test
                                2/12/19
14:35  2/13/19
06:16  2/13/19
06:17


Sodium Level                                 142            140


Potassium Level                             5.4  H          4.8


Chloride Level                               103            100


Carbon Dioxide Level                          24             26


Anion Gap                                    15  H          14  H


Blood Urea Nitrogen                          71  H          84  H


Creatinine                                 5.26  H        7.04  H


Est Glomerular Filtrat Rate
mL/min     
              
            



Glucose Level                                 92            131


Calcium Level                                9.6            9.2


Prothrombin Time                                          15.0  H


Prothrombin Time Ratio                                      1.2


INR International Normalized
Ratio   
                    1.17  
  



Activated Partial
Thromboplast Time  
                    30.9  
  



Phosphorus Level                                           5.4  H


Total Bilirubin                                            0.0  L


Direct Bilirubin                                           0.00


Indirect Bilirubin                                          0.0


Aspartate Amino Transf
(AST/SGOT)    
                      18  
  



Alanine Aminotransferase
(ALT/SGPT)  
                      21  
  



Alkaline Phosphatase                                         85


Total Protein                                               6.8


Albumin                                                     4.0


Globulin                                                   2.80


Albumin/Globulin Ratio                                     1.42


White Blood Count                                                          5.0


Red Blood Count                                                          3.06  L


Hemoglobin                                                                9.1  L


Hematocrit                                                               29.0  L


Mean Corpuscular Volume                                                   94.8


Mean Corpuscular Hemoglobin                                               29.7


Mean Corpuscular Hemoglobin
Concent  
              
                   31.4  L



Red Cell Distribution Width                                               14.0


Platelet Count                                                             237


Mean Platelet Volume                                                       9.7


Immature Granulocytes %                                                 0.800  H


Neutrophils %                                                             53.5


Lymphocytes %                                                             34.0


Monocytes %                                                                9.9


Eosinophils %                                                              1.0


Basophils %                                                                0.8


Nucleated Red Blood Cells %                                                0.0


Immature Granulocytes #                                                 0.040  H


Neutrophils #                                                              2.7


Lymphocytes #                                                              1.7


Monocytes #                                                                0.5


Eosinophils #                                                              0.1


Basophils #                                                                0.0


Nucleated Red Blood Cells #                                                0.0








Subjective


24 Hr Interval Summary


Free Text/Dictation


K was elevated yeserday then normalized.  She was offered discharge but 


preferred to stay the night.  Currently receiving dialysis treatment.  She can 


be discharged after HD





Exam/Review of Systems


Exam


Vitals





Vital Signs


  Date      Temp  Pulse  Resp  B/P (MAP)   Pulse Ox  O2          O2 Flow    FiO2


Time                                                 Delivery    Rate


   2/13/19           57


     11:30


   2/13/19                 18      167/65        96  Room Air


     10:00                           (99)


   2/13/19  97.8


     08:00








Intake and Output





2/12/19 2/12/19 2/13/19





1515:00


23:00


07:00





IntakeIntake Total


400 ml


1500 ml





OutputOutput Total


100 ml





BalanceBalance


400 ml


1400 ml











Constitutional:  alert, oriented, well developed


Psych:  no complaints, nl mood/affect


Head:  normocephalic, atraumatic


Eyes:  nl conjunctiva, EOMI, nl lids, nl sclera, PERRL


ENMT:  nl external ears & nose, nl lips & teeth, nl nasal mucosa & septum


Neck:  supple, non-tender


Respiratory:  clear to auscultation, normal air movement


Cardiovascular:  regular rate and rhythm, nl pulses


Gastrointestinal:  soft, nl liver, spleen, non-tender


Musculoskeletal:  nl extremities to inspection, nl gait and stance


Extremities:  normal pulses


Neurological:  CNS II-XII intact, nl mental status, nl speech, nl strength


Skin:  nl turgor; 


   No rash or lesions


Lymph:  nl lymph nodes





Results


Results 24hrs





Laboratory Tests


Test
                                2/12/19
14:35  2/13/19
06:16  2/13/19
06:17


Sodium Level                                 142            140


Potassium Level                             5.4  H          4.8


Chloride Level                               103            100


Carbon Dioxide Level                          24             26


Anion Gap                                    15  H          14  H


Blood Urea Nitrogen                          71  H          84  H


Creatinine                                 5.26  H        7.04  H


Est Glomerular Filtrat Rate
mL/min     
              
            



Glucose Level                                 92            131


Calcium Level                                9.6            9.2


Prothrombin Time                                          15.0  H


Prothrombin Time Ratio                                      1.2


INR International Normalized
Ratio   
                    1.17  
  



Activated Partial
Thromboplast Time  
                    30.9  
  



Phosphorus Level                                           5.4  H


Total Bilirubin                                            0.0  L


Direct Bilirubin                                           0.00


Indirect Bilirubin                                          0.0


Aspartate Amino Transf
(AST/SGOT)    
                      18  
  



Alanine Aminotransferase
(ALT/SGPT)  
                      21  
  



Alkaline Phosphatase                                         85


Total Protein                                               6.8


Albumin                                                     4.0


Globulin                                                   2.80


Albumin/Globulin Ratio                                     1.42


White Blood Count                                                          5.0


Red Blood Count                                                          3.06  L


Hemoglobin                                                                9.1  L


Hematocrit                                                               29.0  L


Mean Corpuscular Volume                                                   94.8


Mean Corpuscular Hemoglobin                                               29.7


Mean Corpuscular Hemoglobin
Concent  
              
                   31.4  L



Red Cell Distribution Width                                               14.0


Platelet Count                                                             237


Mean Platelet Volume                                                       9.7


Immature Granulocytes %                                                 0.800  H


Neutrophils %                                                             53.5


Lymphocytes %                                                             34.0


Monocytes %                                                                9.9


Eosinophils %                                                              1.0


Basophils %                                                                0.8


Nucleated Red Blood Cells %                                                0.0


Immature Granulocytes #                                                 0.040  H


Neutrophils #                                                              2.7


Lymphocytes #                                                              1.7


Monocytes #                                                                0.5


Eosinophils #                                                              0.1


Basophils #                                                                0.0


Nucleated Red Blood Cells #                                                0.0








Medications


Medication





Current Medications


IV Flush (NS 3 ml) 3 ml PER PROTOCOL IV ;  Start 2/10/19 at 00:30


Ondansetron HCl (Zofran Tab) 4 mg Q6H  PRN PO NAUSEA/VOMITING;  Start 2/10/19 at


00:30


Acetaminophen (Tylenol Tab) 650 mg Q6H  PRN PO .PAIN 1-3 OR TEMP;  Start 2/10/19


at 00:30


Docusate Sodium (Colace) 100 mg Q12H  PRN PO .CONSTIPATION Last administered on 


2/10/19at 20:47; Admin Dose 100 MG;  Start 2/10/19 at 00:30


Bisacodyl (Dulcolax) 5 mg DAILY  PRN PO .CONSTIPATION;  Start 2/10/19 at 00:30


Paroxetine HCl (Paxil) 10 mg DAILY PO  Last administered on 2/13/19at 08:42; 


Admin Dose 10 MG;  Start 2/10/19 at 09:00


Sevelamer Carbonate (Renvela) 800 mg WITH  MEALS PO  Last administered on 


2/13/19at 08:42; Admin Dose 800 MG;  Start 2/10/19 at 07:35


Hydralazine HCl (Apresoline) 10 mg Q4H  PRN IV ELEVATED BLOOD PRESSURE Last 


administered on 2/11/19at 12:47; Admin Dose 10 MG;  Start 2/10/19 at 01:00


Pantoprazole (Protonix Tab) 40 mg DAILY@06 PO  Last administered on 2/13/19at 


05:34; Admin Dose 40 MG;  Start 2/10/19 at 06:00


Atorvastatin Calcium (Lipitor) 80 mg QHS PO  Last administered on 2/12/19at 


20:42; Admin Dose 80 MG;  Start 2/10/19 at 21:00


Carvedilol (Coreg) 6.25 mg BID PO  Last administered on 2/12/19at 20:42; Admin 


Dose 6.25 MG;  Start 2/10/19 at 09:00


Losartan Potassium (Cozaar) 100 mg DAILY PO  Last administered on 2/12/19at 


09:13; Admin Dose 100 MG;  Start 2/10/19 at 09:00


Nifedipine (Procardia Xl) 90 mg DAILY PO  Last administered on 2/12/19at 09:13; 


Admin Dose 90 MG;  Start 2/10/19 at 09:00


Heparin Sodium (Porcine) (Heparin (1000 Units/ml)) 4,000 unit AFTER  DIALYSIS 


CATHETER  Last administered on 2/11/19at 12:47; Admin Dose 3,400 UNIT;  Start 


2/10/19 at 09:00


Albumin Human 100 ml @  100 mls/hr WITH DIALYSIS  PRN IV SBP <90 DURING 


DIALYSIS;  Start 2/10/19 at 09:00


Epoetin Brian (Epogen (Esrd)) 6,000 units MoWeFr@17 SC  Last administered on 


2/11/19at 18:11; Admin Dose 6,000 UNITS;  Start 2/11/19 at 17:00


Mupirocin (Bactroban) 1 applic BID TOP  Last administered on 2/13/19at 08:42; 


Admin Dose 1 APPLIC;  Start 2/11/19 at 09:30;  Stop 2/18/19 at 09:29


Heparin Sodium (Porcine) (Heparin (1000 Units/ml)) 4,000 unit AFTER  DIALYSIS 


CATHETER ;  Start 2/12/19 at 10:00











MICHAEL CABRERA MD          Feb 13, 2019 12:07

## 2019-02-13 NOTE — NUR
Nurse Notes:

patient alert and oriented, no changes in LOC or mentation remained stable throughout the 
shift. No SOB or distress. patient had dialysis today, tolerated well with 1.5 L output. 
Assessed for pain, denies any pain or discomforts. All needs were attended and anticipated. 
All due medications were given, tolerated well. Safety precautions observed throughout the 
shift. Bed alarm and bed brakes on for safety. patient able to ambulate with steady gait. 
Patient was discharged home in stable condition, picked up by sister, Chevy. health teachings 
were provided to the patient and sister. patient able to verbalized understanding and pt 
stated "yes I already known what to do you dont have to repeat. I will go to my dialysis on 
Friday" IV removed. Patient also refused pictures to be taken omn her buttocks and heels. 
Explained to patient the rationale of the pictures but still patient adamantly refused and 
is in a rush to go home as well as the sister. Patient also refused to have the volunteer 
accompany her to the exit. Explained the safety reasons but still patient refused and is 
able to ambulate with steady gait. Denies any pain or discomforts. No SOB or distress.

## 2019-02-13 NOTE — NUR
End of shift Report:

Sleeping on bed in comfortable position. No sob. No resp distress. Afebrile. Denies 
pain/discomfort. Right CW PermCath noted for dialysis, dressing dry and intact. For dialysis 
and DC today. No acute events overnight. Call light within reach. Will continue to monitor 
pt. Call light within reach. Will endorse accordingly.

## 2019-03-14 ENCOUNTER — HOSPITAL ENCOUNTER (OUTPATIENT)
Dept: HOSPITAL 91 - SDS | Age: 79
Discharge: HOME | End: 2019-03-14
Payer: COMMERCIAL

## 2019-03-14 ENCOUNTER — HOSPITAL ENCOUNTER (OUTPATIENT)
Dept: HOSPITAL 10 - SDS | Age: 79
Discharge: HOME | End: 2019-03-14
Attending: SURGERY
Payer: COMMERCIAL

## 2019-03-14 VITALS
BODY MASS INDEX: 23.23 KG/M2 | WEIGHT: 123.02 LBS | BODY MASS INDEX: 23.23 KG/M2 | HEIGHT: 61 IN | HEIGHT: 61 IN | WEIGHT: 123.02 LBS

## 2019-03-14 VITALS — RESPIRATION RATE: 14 BRPM | SYSTOLIC BLOOD PRESSURE: 164 MMHG | DIASTOLIC BLOOD PRESSURE: 70 MMHG | HEART RATE: 60 BPM

## 2019-03-14 VITALS — RESPIRATION RATE: 19 BRPM | DIASTOLIC BLOOD PRESSURE: 70 MMHG | SYSTOLIC BLOOD PRESSURE: 157 MMHG | HEART RATE: 58 BPM

## 2019-03-14 DIAGNOSIS — T82.898A: Primary | ICD-10-CM
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
## 2019-03-14 DIAGNOSIS — N18.6: ICD-10-CM

## 2019-03-14 DIAGNOSIS — I12.0: ICD-10-CM

## 2019-03-14 DIAGNOSIS — I48.91: ICD-10-CM

## 2019-03-14 DIAGNOSIS — E78.5: ICD-10-CM

## 2019-03-14 DIAGNOSIS — E11.9: ICD-10-CM

## 2019-03-14 DIAGNOSIS — I25.10: ICD-10-CM

## 2019-03-14 LAB
ADD MAN DIFF?: NO
ANION GAP: 15 (ref 5–13)
BASOPHIL #: 0 10^3/UL (ref 0–0.1)
BASOPHILS %: 0.9 % (ref 0–2)
BLOOD UREA NITROGEN: 54 MG/DL (ref 7–20)
CALCIUM: 9.6 MG/DL (ref 8.4–10.2)
CARBON DIOXIDE: 30 MMOL/L (ref 21–31)
CHLORIDE: 95 MMOL/L (ref 97–110)
CREATININE: 4.54 MG/DL (ref 0.44–1)
EOSINOPHILS #: 0 10^3/UL (ref 0–0.5)
EOSINOPHILS %: 0.7 % (ref 0–7)
GLUCOSE: 159 MG/DL (ref 70–220)
HEMATOCRIT: 34.8 % (ref 37–47)
HEMOGLOBIN: 10.8 G/DL (ref 12–16)
IMMATURE GRANS #M: 0.03 10^3/UL (ref 0–0.03)
IMMATURE GRANS % (M): 0.7 % (ref 0–0.43)
LYMPHOCYTES #: 1.5 10^3/UL (ref 0.8–2.9)
LYMPHOCYTES %: 33.8 % (ref 15–51)
MEAN CORPUSCULAR HEMOGLOBIN: 29.2 PG (ref 29–33)
MEAN CORPUSCULAR HGB CONC: 31 G/DL (ref 32–37)
MEAN CORPUSCULAR VOLUME: 94.1 FL (ref 82–101)
MEAN PLATELET VOLUME: 9.8 FL (ref 7.4–10.4)
MONOCYTE #: 0.3 10^3/UL (ref 0.3–0.9)
MONOCYTES %: 6.9 % (ref 0–11)
NEUTROPHIL #: 2.6 10^3/UL (ref 1.6–7.5)
NEUTROPHILS %: 57 % (ref 39–77)
NUCLEATED RED BLOOD CELLS #: 0 10^3/UL (ref 0–0)
NUCLEATED RED BLOOD CELLS%: 0 /100WBC (ref 0–0)
PLATELET COUNT: 249 10^3/UL (ref 140–415)
POTASSIUM: 4.6 MMOL/L (ref 3.5–5.1)
RED BLOOD COUNT: 3.7 10^6/UL (ref 4.2–5.4)
RED CELL DISTRIBUTION WIDTH: 12.9 % (ref 11.5–14.5)
SODIUM: 140 MMOL/L (ref 135–144)
WHITE BLOOD COUNT: 4.5 10^3/UL (ref 4.8–10.8)

## 2019-03-14 PROCEDURE — 36901 INTRO CATH DIALYSIS CIRCUIT: CPT

## 2019-03-14 PROCEDURE — 85025 COMPLETE CBC W/AUTO DIFF WBC: CPT

## 2019-03-14 PROCEDURE — 82962 GLUCOSE BLOOD TEST: CPT

## 2019-03-14 PROCEDURE — 71045 X-RAY EXAM CHEST 1 VIEW: CPT

## 2019-03-14 PROCEDURE — 93005 ELECTROCARDIOGRAM TRACING: CPT

## 2019-03-14 PROCEDURE — 80048 BASIC METABOLIC PNL TOTAL CA: CPT

## 2019-03-14 NOTE — CONS
Assessment/Plan


Assessment/Plan


Assessment/Plan (Daily)


DATE 3/14/2019





VASCULAR SURGERY





Dear Doctors:


Ms. Mason is 76-year-old female with ESRD and history of malfunctioning 


right chest wall Perm catheter. It seems that the patient has been on dialysis 


for some time now with history of previous left upper extremity radiocephalic 


fistula that had been nonfunctional. She underwent a new forearm fistula 


creation that required intervention with coil embolization of a large tributary 


to prevent the flow from  being diverted away from the main channel. Since the 


previous intervention she had multiple episodes of infiltration and reported 


malfunction of the fistula.





At the moment, patient denies shortness of breath, chest pain, nausea, vomiting,


fever, or chills.


 


REVIEW OF SYSTEMS:  Fourteen point review performed, negative except was mention


ed in HPI.


 


PAST MEDICAL HISTORY:  Diabetes, hypertension, hyperlipidemia, coronary artery 


disease, anemia of chronic disease, atrial fibrillation, end-stage renal 


disease, on hemodialysis.


  


PAST SURGICAL HISTORY:  Left upper extremity AV fistula creation, Perm catheter 


placements.


 


FAMILY HISTORY:  Hypertension.


 


SOCIAL HISTORY:  Denies tobacco, alcohol or illicit drug use.


 


PHYSICAL EXAMINATION:


GENERAL:  Alert, oriented x3.  No apparent distress.


HEENT:  Normocephalic, atraumatic.  PERRLA.  EOMI.  Mucosa moist.


NECK:  Supple.  No carotid bruit.  Poor dentition.


HEART:  S1, S2 present.  No murmurs.


ABDOMEN:  Soft, nontender, nondistended.  Bowel sounds positive.


EXTREMITIES:  


Lower extremities: Palpable femoral pulse.  Faint pedal pulse.  Motor and 


sensory intact.  Capillary refill 3 seconds.  


Left upper extremity: Palpable brachial pulse.  Motor and sensory intact.  


Previous surgical scar on the forearm that is well healed.  Fistula with weak 


bruit and thrill 





Right chest wall catheter seems to be intact and no erythema identified.


 


IMPRESSION AND PLAN:


-End-stage renal disease, and central stenosis: It seems that the patient has 


had a long-standing history of previous chest wall catheters.  And concern for 


Central stenosis as there.  Will plan for eventual removal of e her perm 


catheter and intervene for her stenosis


-We will schedule the patient for a left arm fistulogram to further delineate 


the findings of notable episodes of infiltration and her continued weak bruit 


and thrill


-Optimize vascular status (blood pressure meds, diet, nutrition, exercise, sugar


control, antiplatelets).


-Discussed findings, plan and management with the patient with a certified 


.  She understands.


-Thank you for allowing us to partake in the care of your patient. Please call 


with any questions.





Consultation Date/Type/Reason


Admit Date/Time





Date/Time of Note


DATE: 3/14/19 


TIME: 14:18





Past Medical History


Home Meds


Reported Medications


Ergocalciferol (Vitamin D2) (VITAMIN D2) 50,000 Unit Capsule, 43855 UNIT PO 


EVERY TWO WEEKS, CAP


   3/14/19


Furosemide* (Lasix*) 40 Mg Tablet, 40 MG PO DAILY, TAB


   3/14/19


Hydralazine Hcl* (Hydralazine Hcl*) 50 Mg Tab, 50 MG PO TID, #90 TAB


   3/14/19


Losartan Potassium* (Losartan Potassium*) 100 Mg Tablet, 100 MG PO DAILY, TAB


   3/14/19


Multivitamins* (Theragran*) 1 Tab Tab, 1 TAB PO DAILY, TAB


   3/14/19


Nifedipine* (Nifedipine ER*) 90 Mg Tablet.sa, 90 MG PO BID, TAB.SA


   3/14/19


[place]-3/Dha/Epa/Fish Oil (FISH  MG SOFTGEL) 1 Each Capsule, 1 EACH PO 


BID, CAP


   3/14/19


Omeprazole* (Omeprazole*) 20 Mg Capsule.dr, 20 MG PO DAILY, #30 CAP


   3/14/19


Paroxetine Hcl* (Paxil*) 10 Mg Tablet, 10 MG PO DAILY, TAB


   3/14/19


Sevelamer Hcl* (Renagel*) 800 Mg Tablet, 800 MG PO WITH MEALS, TAB


   3/14/19


Carvedilol* (Coreg*) 25 Mg Tablet, 25 MG PO BID, #60 TAB


   3/14/19


Aspirin (Low Dose Aspirin) 81 Mg Tablet.dr, 81 MG PO DAILY, #30 TAB


   3/14/19


Discontinued Reported Medications


Sevelamer Hcl* (Renagel*) 800 Mg Tablet, 800 MG PO WITH MEALS, TAB


   10/15/18


Paroxetine Hcl* (Paxil*) 10 Mg Tablet, 10 MG PO DAILY, TAB


   10/15/18


Omeprazole* (Omeprazole*) 20 Mg Capsule.dr, 20 MG PO DAILY, #30 CAP


   10/15/18


Omega-3/Dha/Epa/Fish Oil (FISH  MG SOFTGEL) 1 Each Capsule, 1 EACH PO 


BID, CAP


   10/15/18


Nifedipine* (Nifedipine ER*) 90 Mg Tablet.er, 90 MG PO DAILY, TAB


   10/15/18


Multivitamin* (Daily Value*) 1 Each Tablet, 1 TAB PO DAILY, TAB


   10/15/18


Losartan Potassium* (Losartan Potassium*) 100 Mg Tablet, 100 MG PO DAILY, TAB


   10/15/18


Hydralazine Hcl* (Hydralazine Hcl*) 50 Mg Tab, 50 MG PO TID, #90 TAB


   10/15/18


Furosemide* (Furosemide*) 40 Mg Tablet, 40 MG PO DAILY, TAB


   10/15/18


Carvedilol* (Carvedilol*) 6.25 Mg Tablet, 6.25 MG PO BID, #60 TAB


   10/15/18


Atorvastatin* (Atorvastatin*) 80 Mg Tablet, 80 MG PO QHS, #30 TAB


   10/15/18


Aspirin (Low Dose Aspirin) 81 Mg Tablet.dr, 81 MG PO DAILY, #30 TAB


   10/15/18


Allergies:  


Coded Allergies:  


     No Known Allergy (Unverified , 3/14/19)





Past Surgical History


Past Surgical Hx:  other





Social History


Smoking Status:  Never smoker





Exam/Review of Systems


Exam


Vitals





Vital Signs


  Date      Temp  Pulse  Resp  B/P (MAP)   Pulse Ox  O2          O2 Flow    FiO2


Time                                                 Delivery    Rate


   3/14/19  98.1     58    19      157/70       100


     13:19                           (99)








Results


Result Diagram:  


3/14/19 1310                                                                    


           3/14/19 1310





Results 24hrs





Laboratory Tests


       Test
                                3/14/19
13:10  3/14/19
13:17


       White Blood Count                           4.5  L


       Red Blood Count                           3.70  #L


       Hemoglobin                                 10.8  L


       Hematocrit                                 34.8  L


       Mean Corpuscular Volume                     94.1


       Mean Corpuscular Hemoglobin                 29.2


       Mean Corpuscular Hemoglobin
Concent       31.0  L
  



       Red Cell Distribution Width                 12.9


       Platelet Count                               249


       Mean Platelet Volume                         9.8


       Immature Granulocytes %                   0.700  H


       Neutrophils %                               57.0


       Lymphocytes %                               33.8


       Monocytes %                                  6.9


       Eosinophils %                                0.7


       Basophils %                                  0.9


       Nucleated Red Blood Cells %                  0.0


       Immature Granulocytes #                    0.030


       Neutrophils #                                2.6


       Lymphocytes #                                1.5


       Monocytes #                                  0.3


       Eosinophils #                                0.0


       Basophils #                                  0.0


       Nucleated Red Blood Cells #                  0.0


       Sodium Level                                 140


       Potassium Level                              4.6


       Chloride Level                               95  L


       Carbon Dioxide Level                          30


       Anion Gap                                    15  H


       Blood Urea Nitrogen                          54  H


       Creatinine                                 4.54  H


       Est Glomerular Filtrat Rate
mL/min     
            



       Glucose Level                                159


       Calcium Level                                9.6


       Bedside Glucose                                             158














GÓMEZ LOPEZ MD          Mar 14, 2019 14:21

## 2019-03-14 NOTE — PDOCDIS
Discharge Instructions


DIAGNOSIS


Discharge Diagnosis


ESRD





CONDITION


                 [id number]
Patient Condition:  [id number]
Good








HOME CARE INSTRUCTIONS:


                 [id number]
Special Diet:  [id number]
RENAL DIET








ACTIVITY:


     [id number]
Activity Restrictions:  [id number]
Slowly Increase Activity


                                        Rest between Activity


                                        Avoid heavy lifting


                                        Do not Drive


                                        Do not operate Machinery


                                        Do not operate Power Tool


                                        Avoid Heavy Housework


     [id number]
Bathing Restrictions:   [id number]
Sponge Bath








FOLLOW UP/APPOINTMENTS


Follow-up Plan


Follow-up in 2 weeks











GÓMEZ LOPEZ MD          Mar 14, 2019 14:22

## 2019-03-14 NOTE — HPN
Date/Time of Note


Date/Time of Note


DATE: 3/14/19 


TIME: 14:21





Interval H&P Admission Note


Pt. seen H&P reviewed:  No system changes











GÓMEZ LOPEZ MD          Mar 14, 2019 14:21

## 2019-03-14 NOTE — OPR
DATE OF OPERATION:  03/14/2019

 

 

SURGEON:  Eduar Kline MD

 

PREOPERATIVE DIAGNOSES:

1.  End-stage renal disease.

2.  Malfunctioning left upper extremity fistula.

 

POSTOPERATIVE DIAGNOSIS:  End-stage renal disease.

 

ANESTHESIA:  Local with moderate sedation.

 

ESTIMATED BLOOD LOSS:  Minimal.

 

COMPLICATIONS:  None.

 

HEPARIN:  As recorded.

 

CONTRAST:  As recorded.

 

ACCESS:  Left upper extremity 4-Divehi microcatheter.

 

CLOSURE:  Manual compression.

 

SEDATION:  Under physician supervision, moderate sedation was administered intravenously under contin
uous monitoring by interventional team and attending physician.  Pulse oximeter, heart rate, blood pr
essures were continuously monitored by interventional surgeon.  The physician's spent time was 30 min
utes of face-to-face sedation time with the patient.

 

INDICATIONS:  This is a 78-year-old female with history of end-stage renal disease and noncompliance 
in which she had underwent a left arm brachiocephalic fistula creation.  The patient also had a right
 chest wall catheter that she has been using for her dialysis sessions.  The patient in the past has 
undergone left arm fistulogram with ____ diverting flow away from the main channel; however post-inte
rvention, it appears the patient had multiple attempts of her fistula cannulation that led to infiltr
ation.  The patient also has a weakened bruit and thrill.  Decision was made to perform a fistulogram
 to further delineate the findings prior to her having a repeat cannulation.  Subsequently, the risks
, benefits and alternatives were discussed with the patient.  She agreed to proceed as she has a sign
ificant fear of having her fistula cannulated and wanted to have assurance that her fistula did not h
ave any issues.  Risks and benefits were discussed.  Risks including but not limited to bleeding, thr
ombosis, embolization, myocardial infarction, death, stroke, device malfunction, infection, nephrotox
icity, limb loss.  The patient has agreed to proceed.

 

PROCEDURES PERFORMED:

1.  Ultrasound guidance of left upper extremity fistula.

2.  Left upper extremity fistulogram.

 

DESCRIPTION OF PROCEDURE:  The patient was brought into the angio suite and positioned in supine posi
tion on the fluoroscopic table.  Sedation was administered without any complications.  The left upper
 extremity was shaved, prepped and draped in the usual standard sterile fashion.  Time out and approp
riate site was marked and confirmed.  Local anesthesia was infiltrated in the region of the left uppe
r extremity fistula.  The patient was then cannulated with a micro access needle and ultrasound krunal
nce and guidewire was advanced into cephalic vein under fluoroscopic guidance.  Images were recorded.
  The needle was then removed.  Microcatheter was placed.  A multi-station left upper extremity fistu
logram was then performed which identified the patient has a patent and adequate fistula across the u
pper arm and patent central venous system.  At this point, decision was made that no further interven
tion will be needed and to have the dialysis ____ to restart needle cannulation.  As the patient stil
l has a weakened bruit and thrill that may be related to fact of her heart rate and her blood pressur
e, we will reevaluate the patient with our nephrology colleagues.  At this point, all catheters and n
eedles, wires and sheaths were removed.  Manual compression was held.  The patient tolerated procedur
e well and was taken to the postanesthesia care unit in stable condition.  All instruments catheters,
 wires and needles were correct x2.

 

 

Dictated By: EDUAR ROLLE/RASHAAD

DD:    03/14/2019 18:16:54

DT:    03/14/2019 19:00:04

Conf#: [id number]

DID#:  [id number]

## 2019-03-14 NOTE — RADRPT
Vent Rate: 54 bpm

RR Interval: 0 msec

AR Interval: 182 msec

QRS Duration: 96 msec

QT Interval: 506 msec

QTC Interval: 479 msec

P-R-T Axis: 66 - -43 - 71 degrees

 

 Sinus bradycardia

 Left axis deviation

 Anterior infarct , age undetermined

 Abnormal ECG

 

Electronically Signed By: Zay Allen

## 2023-05-15 NOTE — NUR
[employer]seeds message sent  Aryan Crespo, Medical Assistant  [facility]     VS stable. No acute changes or s/s or respiratory distress during shift.  HD consent signed. 
Hourly rounding provided. Fall precautions observed with call light within reach. Pending 
[id number]. Unable to collect stool sample and Pt planned to have HD today; Will endorse 
continuity of care to oncoming nurse. 

-------------------------------------------------------------------------------

Addendum: 02/11/19 at 0705 by GABE DUNN RN

-------------------------------------------------------------------------------

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